# Patient Record
Sex: FEMALE | Race: WHITE | NOT HISPANIC OR LATINO | Employment: FULL TIME | URBAN - METROPOLITAN AREA
[De-identification: names, ages, dates, MRNs, and addresses within clinical notes are randomized per-mention and may not be internally consistent; named-entity substitution may affect disease eponyms.]

---

## 2018-04-09 LAB
HBV SURFACE AG SERPL QL IA: NON REACTIVE
HIV 1+2 AB+HIV1 P24 AG SERPL QL IA: NON REACTIVE
RUBELLA ANTIBODY IGG QUANTITATIVE: NORMAL IU/ML
T PALLIDUM IGG SER QL: NON REACTIVE

## 2018-10-18 LAB — GROUP B STREP PCR: POSITIVE

## 2018-11-04 ENCOUNTER — HOSPITAL ENCOUNTER (OUTPATIENT)
Dept: LAB | Facility: CLINIC | Age: 33
Discharge: HOME | End: 2018-11-04
Attending: OBSTETRICS & GYNECOLOGY | Admitting: OBSTETRICS & GYNECOLOGY
Payer: COMMERCIAL

## 2018-11-04 ENCOUNTER — ANESTHESIA EVENT (OUTPATIENT)
Dept: OBGYN | Facility: CLINIC | Age: 33
End: 2018-11-04
Payer: COMMERCIAL

## 2018-11-04 DIAGNOSIS — Z01.812 PRE-OPERATIVE LABORATORY EXAMINATION: ICD-10-CM

## 2018-11-04 LAB
ABO + RH BLD: NORMAL
ABO + RH BLD: NORMAL
BLD GP AB SCN SERPL QL: NORMAL
BLOOD BANK CMNT PATIENT-IMP: NORMAL
HGB BLD-MCNC: 10.1 G/DL (ref 11.7–15.7)
SPECIMEN EXP DATE BLD: NORMAL

## 2018-11-04 PROCEDURE — 86900 BLOOD TYPING SEROLOGIC ABO: CPT | Performed by: OBSTETRICS & GYNECOLOGY

## 2018-11-04 PROCEDURE — 36415 COLL VENOUS BLD VENIPUNCTURE: CPT | Performed by: OBSTETRICS & GYNECOLOGY

## 2018-11-04 PROCEDURE — 86901 BLOOD TYPING SEROLOGIC RH(D): CPT | Performed by: OBSTETRICS & GYNECOLOGY

## 2018-11-04 PROCEDURE — 85018 HEMOGLOBIN: CPT | Performed by: OBSTETRICS & GYNECOLOGY

## 2018-11-04 PROCEDURE — 86850 RBC ANTIBODY SCREEN: CPT | Performed by: OBSTETRICS & GYNECOLOGY

## 2018-11-04 PROCEDURE — 86780 TREPONEMA PALLIDUM: CPT | Performed by: OBSTETRICS & GYNECOLOGY

## 2018-11-05 ENCOUNTER — ANESTHESIA (OUTPATIENT)
Dept: OBGYN | Facility: CLINIC | Age: 33
End: 2018-11-05
Payer: COMMERCIAL

## 2018-11-05 ENCOUNTER — HOSPITAL ENCOUNTER (INPATIENT)
Facility: CLINIC | Age: 33
LOS: 3 days | Discharge: HOME | End: 2018-11-08
Attending: OBSTETRICS & GYNECOLOGY | Admitting: OBSTETRICS & GYNECOLOGY
Payer: COMMERCIAL

## 2018-11-05 DIAGNOSIS — Z98.891 S/P CESAREAN SECTION: Primary | ICD-10-CM

## 2018-11-05 LAB
GLUCOSE BLDC GLUCOMTR-MCNC: 94 MG/DL (ref 70–99)
T PALLIDUM AB SER QL: NONREACTIVE

## 2018-11-05 PROCEDURE — 25000128 H RX IP 250 OP 636: Performed by: ANESTHESIOLOGY

## 2018-11-05 PROCEDURE — 25000128 H RX IP 250 OP 636: Performed by: OBSTETRICS & GYNECOLOGY

## 2018-11-05 PROCEDURE — 27210794 ZZH OR GENERAL SUPPLY STERILE: Performed by: OBSTETRICS & GYNECOLOGY

## 2018-11-05 PROCEDURE — 36000056 ZZH SURGERY LEVEL 3 1ST 30 MIN: Performed by: OBSTETRICS & GYNECOLOGY

## 2018-11-05 PROCEDURE — 25000128 H RX IP 250 OP 636: Performed by: NURSE ANESTHETIST, CERTIFIED REGISTERED

## 2018-11-05 PROCEDURE — 12000029 ZZH R&B OB INTERMEDIATE

## 2018-11-05 PROCEDURE — 37000008 ZZH ANESTHESIA TECHNICAL FEE, 1ST 30 MIN: Performed by: OBSTETRICS & GYNECOLOGY

## 2018-11-05 PROCEDURE — 25000125 ZZHC RX 250: Performed by: OBSTETRICS & GYNECOLOGY

## 2018-11-05 PROCEDURE — 99252 IP/OBS CONSLTJ NEW/EST SF 35: CPT | Performed by: PHYSICIAN ASSISTANT

## 2018-11-05 PROCEDURE — 71000013 ZZH RECOVERY PHASE 1 LEVEL 1 EA ADDTL HR: Performed by: OBSTETRICS & GYNECOLOGY

## 2018-11-05 PROCEDURE — 36000058 ZZH SURGERY LEVEL 3 EA 15 ADDTL MIN: Performed by: OBSTETRICS & GYNECOLOGY

## 2018-11-05 PROCEDURE — 71000012 ZZH RECOVERY PHASE 1 LEVEL 1 FIRST HR: Performed by: OBSTETRICS & GYNECOLOGY

## 2018-11-05 PROCEDURE — 37000009 ZZH ANESTHESIA TECHNICAL FEE, EACH ADDTL 15 MIN: Performed by: OBSTETRICS & GYNECOLOGY

## 2018-11-05 PROCEDURE — 25000125 ZZHC RX 250: Performed by: NURSE ANESTHETIST, CERTIFIED REGISTERED

## 2018-11-05 PROCEDURE — 00000146 ZZHCL STATISTIC GLUCOSE BY METER IP

## 2018-11-05 PROCEDURE — 25000132 ZZH RX MED GY IP 250 OP 250 PS 637: Performed by: OBSTETRICS & GYNECOLOGY

## 2018-11-05 PROCEDURE — 25000131 ZZH RX MED GY IP 250 OP 636 PS 637: Performed by: PHYSICIAN ASSISTANT

## 2018-11-05 PROCEDURE — 25000132 ZZH RX MED GY IP 250 OP 250 PS 637: Performed by: ANESTHESIOLOGY

## 2018-11-05 RX ORDER — NALBUPHINE HYDROCHLORIDE 10 MG/ML
2.5-5 INJECTION, SOLUTION INTRAMUSCULAR; INTRAVENOUS; SUBCUTANEOUS EVERY 6 HOURS PRN
Status: DISCONTINUED | OUTPATIENT
Start: 2018-11-05 | End: 2018-11-08 | Stop reason: HOSPADM

## 2018-11-05 RX ORDER — FENTANYL CITRATE 50 UG/ML
25-50 INJECTION, SOLUTION INTRAMUSCULAR; INTRAVENOUS EVERY 5 MIN PRN
Status: DISCONTINUED | OUTPATIENT
Start: 2018-11-05 | End: 2018-11-05 | Stop reason: HOSPADM

## 2018-11-05 RX ORDER — SCOLOPAMINE TRANSDERMAL SYSTEM 1 MG/1
1 PATCH, EXTENDED RELEASE TRANSDERMAL ONCE
Status: DISCONTINUED | OUTPATIENT
Start: 2018-11-05 | End: 2018-11-06 | Stop reason: CLARIF

## 2018-11-05 RX ORDER — ALBUTEROL SULFATE 0.83 MG/ML
2.5 SOLUTION RESPIRATORY (INHALATION) EVERY 4 HOURS PRN
Status: DISCONTINUED | OUTPATIENT
Start: 2018-11-05 | End: 2018-11-05 | Stop reason: HOSPADM

## 2018-11-05 RX ORDER — MAGNESIUM HYDROXIDE 1200 MG/15ML
LIQUID ORAL PRN
Status: DISCONTINUED | OUTPATIENT
Start: 2018-11-05 | End: 2018-11-05

## 2018-11-05 RX ORDER — MERCAPTOPURINE 50 MG/1
50 TABLET ORAL DAILY
Status: DISCONTINUED | OUTPATIENT
Start: 2018-11-05 | End: 2018-11-06

## 2018-11-05 RX ORDER — BUPIVACAINE HYDROCHLORIDE 7.5 MG/ML
INJECTION, SOLUTION INTRASPINAL PRN
Status: DISCONTINUED | OUTPATIENT
Start: 2018-11-05 | End: 2018-11-05

## 2018-11-05 RX ORDER — HYDROMORPHONE HYDROCHLORIDE 2 MG/1
2-4 TABLET ORAL
Status: DISCONTINUED | OUTPATIENT
Start: 2018-11-05 | End: 2018-11-08 | Stop reason: HOSPADM

## 2018-11-05 RX ORDER — METOCLOPRAMIDE HYDROCHLORIDE 5 MG/ML
INJECTION INTRAMUSCULAR; INTRAVENOUS PRN
Status: DISCONTINUED | OUTPATIENT
Start: 2018-11-05 | End: 2018-11-05

## 2018-11-05 RX ORDER — NALOXONE HYDROCHLORIDE 0.4 MG/ML
.1-.4 INJECTION, SOLUTION INTRAMUSCULAR; INTRAVENOUS; SUBCUTANEOUS
Status: DISCONTINUED | OUTPATIENT
Start: 2018-11-05 | End: 2018-11-08 | Stop reason: HOSPADM

## 2018-11-05 RX ORDER — OXYTOCIN/0.9 % SODIUM CHLORIDE 30/500 ML
PLASTIC BAG, INJECTION (ML) INTRAVENOUS PRN
Status: DISCONTINUED | OUTPATIENT
Start: 2018-11-05 | End: 2018-11-05

## 2018-11-05 RX ORDER — LIDOCAINE 40 MG/G
CREAM TOPICAL
Status: DISCONTINUED | OUTPATIENT
Start: 2018-11-05 | End: 2018-11-08 | Stop reason: HOSPADM

## 2018-11-05 RX ORDER — DEXTROSE, SODIUM CHLORIDE, SODIUM LACTATE, POTASSIUM CHLORIDE, AND CALCIUM CHLORIDE 5; .6; .31; .03; .02 G/100ML; G/100ML; G/100ML; G/100ML; G/100ML
INJECTION, SOLUTION INTRAVENOUS CONTINUOUS
Status: DISCONTINUED | OUTPATIENT
Start: 2018-11-05 | End: 2018-11-08 | Stop reason: HOSPADM

## 2018-11-05 RX ORDER — AMOXICILLIN 250 MG
2 CAPSULE ORAL 2 TIMES DAILY PRN
Status: DISCONTINUED | OUTPATIENT
Start: 2018-11-05 | End: 2018-11-08 | Stop reason: HOSPADM

## 2018-11-05 RX ORDER — NICOTINE POLACRILEX 4 MG
15-30 LOZENGE BUCCAL
Status: DISCONTINUED | OUTPATIENT
Start: 2018-11-05 | End: 2018-11-05

## 2018-11-05 RX ORDER — DEXTROSE MONOHYDRATE 25 G/50ML
25-50 INJECTION, SOLUTION INTRAVENOUS
Status: DISCONTINUED | OUTPATIENT
Start: 2018-11-05 | End: 2018-11-05

## 2018-11-05 RX ORDER — HYDRALAZINE HYDROCHLORIDE 20 MG/ML
2.5-5 INJECTION INTRAMUSCULAR; INTRAVENOUS EVERY 10 MIN PRN
Status: DISCONTINUED | OUTPATIENT
Start: 2018-11-05 | End: 2018-11-05 | Stop reason: HOSPADM

## 2018-11-05 RX ORDER — OXYTOCIN/0.9 % SODIUM CHLORIDE 30/500 ML
340 PLASTIC BAG, INJECTION (ML) INTRAVENOUS CONTINUOUS PRN
Status: DISCONTINUED | OUTPATIENT
Start: 2018-11-05 | End: 2018-11-08 | Stop reason: HOSPADM

## 2018-11-05 RX ORDER — ONDANSETRON 2 MG/ML
4 INJECTION INTRAMUSCULAR; INTRAVENOUS EVERY 30 MIN PRN
Status: DISCONTINUED | OUTPATIENT
Start: 2018-11-05 | End: 2018-11-08 | Stop reason: HOSPADM

## 2018-11-05 RX ORDER — OXYTOCIN 10 [USP'U]/ML
10 INJECTION, SOLUTION INTRAMUSCULAR; INTRAVENOUS
Status: DISCONTINUED | OUTPATIENT
Start: 2018-11-05 | End: 2018-11-08 | Stop reason: HOSPADM

## 2018-11-05 RX ORDER — ONDANSETRON 2 MG/ML
4 INJECTION INTRAMUSCULAR; INTRAVENOUS EVERY 4 HOURS PRN
Status: DISCONTINUED | OUTPATIENT
Start: 2018-11-05 | End: 2018-11-08 | Stop reason: HOSPADM

## 2018-11-05 RX ORDER — OXYCODONE HYDROCHLORIDE 5 MG/1
5 TABLET ORAL EVERY 4 HOURS PRN
Status: DISCONTINUED | OUTPATIENT
Start: 2018-11-05 | End: 2018-11-08 | Stop reason: HOSPADM

## 2018-11-05 RX ORDER — GLYCOPYRROLATE 0.2 MG/ML
INJECTION, SOLUTION INTRAMUSCULAR; INTRAVENOUS PRN
Status: DISCONTINUED | OUTPATIENT
Start: 2018-11-05 | End: 2018-11-05

## 2018-11-05 RX ORDER — CEFAZOLIN SODIUM 2 G/100ML
2 INJECTION, SOLUTION INTRAVENOUS
Status: COMPLETED | OUTPATIENT
Start: 2018-11-05 | End: 2018-11-05

## 2018-11-05 RX ORDER — ONDANSETRON 2 MG/ML
INJECTION INTRAMUSCULAR; INTRAVENOUS PRN
Status: DISCONTINUED | OUTPATIENT
Start: 2018-11-05 | End: 2018-11-05

## 2018-11-05 RX ORDER — AMOXICILLIN 250 MG
1 CAPSULE ORAL 2 TIMES DAILY PRN
Status: DISCONTINUED | OUTPATIENT
Start: 2018-11-05 | End: 2018-11-08 | Stop reason: HOSPADM

## 2018-11-05 RX ORDER — LEVOTHYROXINE SODIUM 88 UG/1
88 TABLET ORAL DAILY
Status: DISCONTINUED | OUTPATIENT
Start: 2018-11-05 | End: 2018-11-08 | Stop reason: HOSPADM

## 2018-11-05 RX ORDER — MORPHINE SULFATE 1 MG/ML
INJECTION, SOLUTION EPIDURAL; INTRATHECAL; INTRAVENOUS PRN
Status: DISCONTINUED | OUTPATIENT
Start: 2018-11-05 | End: 2018-11-05

## 2018-11-05 RX ORDER — SODIUM CHLORIDE, SODIUM LACTATE, POTASSIUM CHLORIDE, CALCIUM CHLORIDE 600; 310; 30; 20 MG/100ML; MG/100ML; MG/100ML; MG/100ML
INJECTION, SOLUTION INTRAVENOUS CONTINUOUS
Status: DISCONTINUED | OUTPATIENT
Start: 2018-11-05 | End: 2018-11-05

## 2018-11-05 RX ORDER — CITRIC ACID/SODIUM CITRATE 334-500MG
30 SOLUTION, ORAL ORAL
Status: COMPLETED | OUTPATIENT
Start: 2018-11-05 | End: 2018-11-05

## 2018-11-05 RX ORDER — ACETAMINOPHEN 325 MG/1
975 TABLET ORAL EVERY 8 HOURS
Status: COMPLETED | OUTPATIENT
Start: 2018-11-05 | End: 2018-11-08

## 2018-11-05 RX ORDER — METOPROLOL TARTRATE 1 MG/ML
1-2 INJECTION, SOLUTION INTRAVENOUS EVERY 5 MIN PRN
Status: DISCONTINUED | OUTPATIENT
Start: 2018-11-05 | End: 2018-11-05 | Stop reason: HOSPADM

## 2018-11-05 RX ORDER — NALOXONE HYDROCHLORIDE 0.4 MG/ML
.1-.4 INJECTION, SOLUTION INTRAMUSCULAR; INTRAVENOUS; SUBCUTANEOUS
Status: ACTIVE | OUTPATIENT
Start: 2018-11-05 | End: 2018-11-06

## 2018-11-05 RX ORDER — NICOTINE POLACRILEX 4 MG
15-30 LOZENGE BUCCAL
Status: DISCONTINUED | OUTPATIENT
Start: 2018-11-05 | End: 2018-11-08 | Stop reason: HOSPADM

## 2018-11-05 RX ORDER — HYDROCORTISONE 2.5 %
CREAM (GRAM) TOPICAL 3 TIMES DAILY PRN
Status: DISCONTINUED | OUTPATIENT
Start: 2018-11-05 | End: 2018-11-08 | Stop reason: HOSPADM

## 2018-11-05 RX ORDER — LANOLIN 100 %
OINTMENT (GRAM) TOPICAL
Status: DISCONTINUED | OUTPATIENT
Start: 2018-11-05 | End: 2018-11-08 | Stop reason: HOSPADM

## 2018-11-05 RX ORDER — FENTANYL CITRATE 50 UG/ML
25-50 INJECTION, SOLUTION INTRAMUSCULAR; INTRAVENOUS
Status: DISCONTINUED | OUTPATIENT
Start: 2018-11-05 | End: 2018-11-06

## 2018-11-05 RX ORDER — OXYTOCIN/0.9 % SODIUM CHLORIDE 30/500 ML
100 PLASTIC BAG, INJECTION (ML) INTRAVENOUS CONTINUOUS
Status: DISCONTINUED | OUTPATIENT
Start: 2018-11-05 | End: 2018-11-08 | Stop reason: HOSPADM

## 2018-11-05 RX ORDER — SIMETHICONE 80 MG
80 TABLET,CHEWABLE ORAL 4 TIMES DAILY PRN
Status: DISCONTINUED | OUTPATIENT
Start: 2018-11-05 | End: 2018-11-08 | Stop reason: HOSPADM

## 2018-11-05 RX ORDER — SODIUM CHLORIDE, SODIUM LACTATE, POTASSIUM CHLORIDE, CALCIUM CHLORIDE 600; 310; 30; 20 MG/100ML; MG/100ML; MG/100ML; MG/100ML
INJECTION, SOLUTION INTRAVENOUS CONTINUOUS
Status: DISCONTINUED | OUTPATIENT
Start: 2018-11-05 | End: 2018-11-08 | Stop reason: HOSPADM

## 2018-11-05 RX ORDER — HYDROMORPHONE HYDROCHLORIDE 1 MG/ML
.3-.5 INJECTION, SOLUTION INTRAMUSCULAR; INTRAVENOUS; SUBCUTANEOUS EVERY 10 MIN PRN
Status: DISCONTINUED | OUTPATIENT
Start: 2018-11-05 | End: 2018-11-06

## 2018-11-05 RX ORDER — EPHEDRINE SULFATE 50 MG/ML
INJECTION, SOLUTION INTRAVENOUS PRN
Status: DISCONTINUED | OUTPATIENT
Start: 2018-11-05 | End: 2018-11-05

## 2018-11-05 RX ORDER — MEPERIDINE HYDROCHLORIDE 50 MG/ML
12.5 INJECTION INTRAMUSCULAR; INTRAVENOUS; SUBCUTANEOUS
Status: DISCONTINUED | OUTPATIENT
Start: 2018-11-05 | End: 2018-11-08 | Stop reason: HOSPADM

## 2018-11-05 RX ORDER — ACETAMINOPHEN 325 MG/1
650 TABLET ORAL EVERY 4 HOURS PRN
Status: DISCONTINUED | OUTPATIENT
Start: 2018-11-08 | End: 2018-11-08 | Stop reason: HOSPADM

## 2018-11-05 RX ORDER — ONDANSETRON 2 MG/ML
4 INJECTION INTRAMUSCULAR; INTRAVENOUS EVERY 6 HOURS PRN
Status: DISCONTINUED | OUTPATIENT
Start: 2018-11-05 | End: 2018-11-08 | Stop reason: HOSPADM

## 2018-11-05 RX ORDER — ONDANSETRON 4 MG/1
4 TABLET, ORALLY DISINTEGRATING ORAL EVERY 30 MIN PRN
Status: DISCONTINUED | OUTPATIENT
Start: 2018-11-05 | End: 2018-11-08 | Stop reason: HOSPADM

## 2018-11-05 RX ORDER — FENTANYL CITRATE 50 UG/ML
INJECTION, SOLUTION INTRAMUSCULAR; INTRAVENOUS PRN
Status: DISCONTINUED | OUTPATIENT
Start: 2018-11-05 | End: 2018-11-05

## 2018-11-05 RX ORDER — NALOXONE HYDROCHLORIDE 0.4 MG/ML
.1-.4 INJECTION, SOLUTION INTRAMUSCULAR; INTRAVENOUS; SUBCUTANEOUS
Status: DISCONTINUED | OUTPATIENT
Start: 2018-11-05 | End: 2018-11-06

## 2018-11-05 RX ORDER — DEXTROSE MONOHYDRATE 25 G/50ML
25-50 INJECTION, SOLUTION INTRAVENOUS
Status: DISCONTINUED | OUTPATIENT
Start: 2018-11-05 | End: 2018-11-08 | Stop reason: HOSPADM

## 2018-11-05 RX ORDER — HYDROMORPHONE HYDROCHLORIDE 1 MG/ML
.3-.5 INJECTION, SOLUTION INTRAMUSCULAR; INTRAVENOUS; SUBCUTANEOUS EVERY 30 MIN PRN
Status: DISCONTINUED | OUTPATIENT
Start: 2018-11-05 | End: 2018-11-08 | Stop reason: HOSPADM

## 2018-11-05 RX ORDER — BISACODYL 10 MG
10 SUPPOSITORY, RECTAL RECTAL DAILY PRN
Status: DISCONTINUED | OUTPATIENT
Start: 2018-11-07 | End: 2018-11-08 | Stop reason: HOSPADM

## 2018-11-05 RX ADMIN — BUPIVACAINE HYDROCHLORIDE IN DEXTROSE 13.5 MG: 7.5 INJECTION, SOLUTION SUBARACHNOID at 07:34

## 2018-11-05 RX ADMIN — NALBUPHINE HYDROCHLORIDE 5 MG: 10 INJECTION, SOLUTION INTRAMUSCULAR; INTRAVENOUS; SUBCUTANEOUS at 09:48

## 2018-11-05 RX ADMIN — ONDANSETRON 4 MG: 2 INJECTION INTRAMUSCULAR; INTRAVENOUS at 07:44

## 2018-11-05 RX ADMIN — SENNOSIDES AND DOCUSATE SODIUM 1 TABLET: 8.6; 5 TABLET ORAL at 21:50

## 2018-11-05 RX ADMIN — OXYTOCIN-SODIUM CHLORIDE 0.9% IV SOLN 30 UNIT/500ML 100 ML/HR: 30-0.9/5 SOLUTION at 11:45

## 2018-11-05 RX ADMIN — Medication 0.15 MG: at 07:34

## 2018-11-05 RX ADMIN — CEFAZOLIN SODIUM 2 G: 2 INJECTION, SOLUTION INTRAVENOUS at 07:31

## 2018-11-05 RX ADMIN — EPHEDRINE SULFATE 10 MG: 50 INJECTION, SOLUTION INTRAVENOUS at 07:47

## 2018-11-05 RX ADMIN — ACETAMINOPHEN 975 MG: 325 TABLET, FILM COATED ORAL at 13:39

## 2018-11-05 RX ADMIN — INSULIN ASPART 5 UNITS: 100 INJECTION, SOLUTION INTRAVENOUS; SUBCUTANEOUS at 18:40

## 2018-11-05 RX ADMIN — PHENYLEPHRINE HYDROCHLORIDE 100 MCG: 10 INJECTION, SOLUTION INTRAMUSCULAR; INTRAVENOUS; SUBCUTANEOUS at 08:10

## 2018-11-05 RX ADMIN — PHENYLEPHRINE HYDROCHLORIDE 100 MCG: 10 INJECTION, SOLUTION INTRAMUSCULAR; INTRAVENOUS; SUBCUTANEOUS at 07:41

## 2018-11-05 RX ADMIN — OXYTOCIN-SODIUM CHLORIDE 0.9% IV SOLN 30 UNIT/500ML 1 ML: 30-0.9/5 SOLUTION at 08:02

## 2018-11-05 RX ADMIN — OXYCODONE HYDROCHLORIDE 5 MG: 5 TABLET ORAL at 15:47

## 2018-11-05 RX ADMIN — SODIUM CHLORIDE, SODIUM LACTATE, POTASSIUM CHLORIDE, CALCIUM CHLORIDE AND DEXTROSE MONOHYDRATE: 5; 600; 310; 30; 20 INJECTION, SOLUTION INTRAVENOUS at 16:52

## 2018-11-05 RX ADMIN — OXYTOCIN-SODIUM CHLORIDE 0.9% IV SOLN 30 UNIT/500ML 199 ML: 30-0.9/5 SOLUTION at 08:17

## 2018-11-05 RX ADMIN — GLYCOPYRROLATE 0.2 MG: 0.2 INJECTION, SOLUTION INTRAMUSCULAR; INTRAVENOUS at 07:51

## 2018-11-05 RX ADMIN — ACETAMINOPHEN 975 MG: 325 TABLET, FILM COATED ORAL at 21:50

## 2018-11-05 RX ADMIN — FENTANYL CITRATE 20 MCG: 50 INJECTION, SOLUTION INTRAMUSCULAR; INTRAVENOUS at 07:34

## 2018-11-05 RX ADMIN — SODIUM CHLORIDE, POTASSIUM CHLORIDE, SODIUM LACTATE AND CALCIUM CHLORIDE: 600; 310; 30; 20 INJECTION, SOLUTION INTRAVENOUS at 07:22

## 2018-11-05 RX ADMIN — METOCLOPRAMIDE HYDROCHLORIDE 10 MG: 5 INJECTION INTRAMUSCULAR; INTRAVENOUS at 07:44

## 2018-11-05 RX ADMIN — SODIUM CITRATE AND CITRIC ACID MONOHYDRATE 30 ML: 500; 334 SOLUTION ORAL at 06:30

## 2018-11-05 RX ADMIN — EPHEDRINE SULFATE 10 MG: 50 INJECTION, SOLUTION INTRAVENOUS at 07:39

## 2018-11-05 RX ADMIN — SODIUM CHLORIDE, POTASSIUM CHLORIDE, SODIUM LACTATE AND CALCIUM CHLORIDE 1000 ML: 600; 310; 30; 20 INJECTION, SOLUTION INTRAVENOUS at 06:13

## 2018-11-05 NOTE — ANESTHESIA POSTPROCEDURE EVALUATION
Patient: Courtney Sparks    Procedure(s):   SECTION    Diagnosis:Previous  Diagnosis Additional Information: Pre-operative diagnosis: Previous  section  Post-operative diagnosis Same  Procedure: Procedure(s):   SECTION        Anesthesia Type:  Spinal    Note:  Anesthesia Post Evaluation    Patient location during evaluation: OB PACU  Patient participation: Able to fully participate in evaluation  Level of consciousness: awake  Pain management: adequate  Airway patency: patent  Cardiovascular status: acceptable  Respiratory status: acceptable  Hydration status: acceptable  PONV: controlled     Anesthetic complications: None    Comments: Anticipate full return of neurologic function          Last vitals:  Vitals:    18 0602 18 0836   BP: 132/66 97/54   Pulse: 99    Resp: 16 (P) 18   Temp: 99  F (37.2  C) (P) 97.7  F (36.5  C)   SpO2: 99% (P) 99%         Electronically Signed By: Ronni Muñiz MD  2018  9:08 AM

## 2018-11-05 NOTE — ANESTHESIA PROCEDURE NOTES
Peripheral nerve/Neuraxial procedure note : intrathecal  Pre-Procedure  Performed by MARY COLUNGA  Referred by AMISHA  Location: OR    Procedure Times:11/5/2018 7:31 AM and 11/5/2018 7:34 AM  Pre-Anesthestic Checklist: patient identified, IV checked, risks and benefits discussed, informed consent, monitors and equipment checked, pre-op evaluation and at physician/surgeon's request    Timeout  Correct Patient: Yes   Correct Procedure: Yes   Correct Site: Yes   Correct Laterality: N/A   Correct Position: Yes   Site Marked: N/A   .   Procedure Documentation  ASA 3  Diagnosis:repeat csection.    Procedure:    Intrathecal.  Insertion Site:L3-4  (midline approach)      Patient Prep;mask, sterile gloves, povidone-iodine 7.5% surgical scrub.  .  Needle: Kate tip Spinal Needle (gauge): 25  Spinal/LP Needle Length (inches): 3.5 # of attempts: 1 and # of redirects:  Introducer used .       Assessment/Narrative  Paresthesias: Yes and Resolved.  .  .  clear CSF fluid removed .

## 2018-11-05 NOTE — ANESTHESIA PREPROCEDURE EVALUATION
PAC NOTE:       ANESTHESIA PRE EVALUATION:  Anesthesia Evaluation     . Pt has had prior anesthetic. Type: Regional    No history of anesthetic complications          ROS/MED HX    ENT/Pulmonary:  - neg pulmonary ROS     Neurologic:       Cardiovascular:     (+) Dyslipidemia, ----. : . . . :. .       METS/Exercise Tolerance:     Hematologic:     (+) Other Hematologic Disorder-beta thalassemia      Musculoskeletal:         GI/Hepatic:     (+) Inflammatory bowel disease (ulcerative colitis),       Renal/Genitourinary:         Endo:     (+) type I DM, Using insulin thyroid problem hypothyroidism, .      Psychiatric:         Infectious Disease:         Malignancy:         Other:                     Physical Exam      Airway   Mallampati: II  TM distance: >3 FB  Neck ROM: full    Dental     Cardiovascular   Rhythm and rate: regular and normal      Pulmonary    breath sounds clear to auscultation    Other findings: Lab Test        11/04/18     06/25/15     06/24/15     06/24/15     06/23/15      --          06/22/15                       0915          0654          1107          0650          0600           --           0140          WBC           --          12.7*         --          16.5*        21.8*          < >        19.8*         HGB          10.1*        7.8*          --          7.8*         8.1*           < >        10.3*         MCV           --          72*           --          73*          72*            < >        72*           PLT           --          448           --          339          307            < >        347           INR           --          1.18*        Unable to c*  --           --           --          1.27*          < > = values in this interval not displayed.                  Lab Test        06/25/15     06/24/15     06/23/15                       0654          0650          0600          NA           137          139          139           POTASSIUM    3.8          4.1          3.9            CHLORIDE     105          108          109           CO2          23           20           22            BUN          10           11           8             CR           0.64         0.71         0.76          ANIONGAP     9            11           8             OANH          7.3*         7.1*         6.8*          GLC          187*         127*         59*                  Anesthesia Plan      History & Physical Review  History and physical reviewed and following examination; no interval change.    ASA Status:  3 .    NPO Status:  > 8 hours    Plan for Spinal with Intravenous and Propofol induction. Maintenance will be Balanced.    PONV prophylaxis:  Ondansetron (or other 5HT-3) and Dexamethasone or Solumedrol       Postoperative Care  Postoperative pain management:  IV analgesics, Oral pain medications and Multi-modal analgesia.      Consents  Anesthetic plan, risks, benefits and alternatives discussed with:  Patient..                            .

## 2018-11-05 NOTE — IP AVS SNAPSHOT
MRN:[id number]                      After Visit Summary   11/5/2018    Courtney Sparks    MRN: [id number]           Thank you!     Thank you for choosing [facility] for your care. Our goal is always to provide you with excellent care. Hearing back from our patients is one way we can continue to improve our services. Please take a few minutes to complete the written survey that you may receive in the mail after you visit. If you would like to speak to someone directly about your visit please contact Patient Relations at [phone]. Thank you!          Patient Information     Date Of Birth          1985        Designated Caregiver       Most Recent Value    Caregiver    Will someone help with your care after discharge? no      About your hospital stay     You were admitted on:  November 5, 2018 You last received care in the:  [facility]    You were discharged on:  November 8, 2018        Reason for your hospital stay       Delivery of your baby!                  Who to Call     For medical emergencies, please call 911.  For non-urgent questions about your medical care, please call your primary care provider or clinic, [phone]  For questions related to your surgery, please call your surgery clinic        Attending Provider     Provider Specialty    Brayden Menjivar MD OB/Gyn       Primary Care Provider Office Phone # Fax #    Eden Harmon [phone]       When to contact your care team       Please call the clinic if: -You are soaking a thick pad with blood in 1 hour for 2 hours in a row. -Your bleeding becomes foul smelling. -You develop fever greater than 100.4. -Your breast(s) develop redness, swelling and pain. -Postpartum depression is a real and treatable condition. It is normal to feel some sadness during the first two weeks postpartum. If you feel that you are not caring for yourself, do not want to get out of bed, take care of  your baby or feel depressed or hopeless please call the clinic. If you feel like hurting yourself or your baby call a crisis center or come to the emergency department.                  Follow-up Appointments     Follow-up and recommended labs and tests        - Follow up with your primary OB provider in  weeks for your post-partum visit and to check your thyroid checked  - Follow up with your endocrinologist as previously discussed  - Follow up with your GI specialist as needed  - Follow-up with endocrinology in 5-6 weeks to discuss diabetic management and insulin dosing.  I would recommend continuing 8 units twice daily of Tresiba and your short acting meal coverage and correction scale as previously being performed.  If blood glucose continuously remains elevated I would increase your Tresiba to 10 units subcutaneous twice daily.  Conversely if blood sugar is consistently low I would decrease Tresiba to 6 units twice daily subcutaneous.  Also notify your PCP or endocrinologist of any of these changes.                  Further instructions from your care team       Postop  Birth Instructions    Activity       Do not lift more than 10 pounds for 6 weeks after surgery.  Ask family and friends for help when you need it.    No driving until you have stopped taking your pain medications (usually two weeks after surgery).    No heavy exercise or activity for 6 weeks.  Don't do anything that will put a strain on your surgery site.    Don't strain when using the toilet.  Your care team may prescribe a stool softener if you have problems with your bowel movements.     To care for your incision:       Keep the incision clean and dry.    Do not soak your incision in water. No swimming or hot tubs until it has fully healed. You may soak in the bathtub if the water level is below your incision.    Do not use peroxide, gel, cream, lotion, or ointment on your incision.    Adjust your clothes to avoid pressure on your  surgery site (check the elastic in your underwear for example).     You may see a small amount of clear or pink drainage and this is normal.  Check with your health care provider:       If the drainage increases or has an odor.    If the incision reddens, you have swelling, or develop a rash.    If you have increased pain and the medicine we prescribed doesn't help.    If you have a fever above 100.4 F (38 C) with or without chills when placing thermometer under your tongue.   The area around your incision (surgery wound), will feel numb.  This is normal. The numbness should go away in less than a year.     Keep your hands clean:  Always wash your hands before touching your incision (surgery wound). This helps reduce your risk of infection. If your hands aren't dirty, you may use an alcohol hand-rub to clean your hands. Keep your nails clean and short.    Call your healthcare provider if you have any of these symptoms:       You soak a sanitary pad with blood within 1 hour, or you see blood clots larger than a golf ball.    Bleeding that lasts more than 6 weeks.    Vaginal discharge that smells bad.    Severe pain, cramping or tenderness in your lower belly area.    A need to urinate more frequently (use the toilet more often), more urgently (use the toilet very quickly), or it burns when you urinate.    Nausea and vomiting.    Redness, swelling or pain around a vein in your leg.    Problems breastfeeding or a red or painful area on your breast.    Chest pain and cough or are gasping for air.    Problems with coping with sadness, anxiety or depression. If you have concerns about hurting yourself or the baby, call your provider immediately.      You have questions or concerns after you return home.                  Pending Results     No orders found from 11/3/2018 to 11/6/2018.            Admission Information     Date & Time Provider Department Dept. Phone    11/5/2018 Brayden Menjivar MD [facility]  "Postpartum [phone]      Your Vitals Were     Blood Pressure Pulse Temperature Respirations Height Weight    124/83 93 98.1  F (36.7  C) (Oral) 12 1.6 m (5' 3\") 85.7 kg (189 lb)    Last Period Pulse Oximetry BMI (Body Mass Index)             02/06/2018 95% 33.48 kg/m2         [employer] Information     [employer] gives you secure access to your electronic health record. If you see a primary care provider, you can also send messages to your care team and make appointments. If you have questions, please call your primary care clinic.  If you do not have a primary care provider, please call [phone] and they will assist you.        Care EveryWhere ID     This is your Care EveryWhere ID. This could be used by other organizations to access your [facility] medical records  [id number]        Equal Access to Services     KONG ROYAL : Karine Garces, aubrey dunbar, bowen hanna. So [facility] [phone].    ATENCIÓN: Si habla español, tiene a streeter disposición servicios gratuitos de asistencia lingüística. Mary Anne al [phone].    We comply with applicable federal civil rights laws and [place] laws. We do not discriminate on the basis of race, color, national origin, age, disability, sex, sexual orientation, or gender identity.               Review of your medicines      START taking        Dose / Directions    acetaminophen 325 MG tablet   Commonly known as:  TYLENOL        Dose:  650 mg   Take 2 tablets (650 mg) by mouth every 4 hours as needed for other (multimodal surgical pain management along with NSAIDS and opioid medication as indicated based on pain control and physical function.)   Quantity:  50 tablet   Refills:  1       ferrous sulfate 325 (65 Fe) MG tablet   Commonly known as:  IRON        Dose:  325 mg   Take 1 tablet (325 mg) by mouth 2 times daily   Quantity:  60 tablet   Refills:  1       oxyCODONE IR 5 MG tablet   Commonly known as:  " ROXICODONE        Dose:  5 mg   Take 1 tablet (5 mg) by mouth every 4 hours as needed for moderate to severe pain   Quantity:  16 tablet   Refills:  0       senna-docusate 8.6-50 MG per tablet   Commonly known as:  SENOKOT-S;PERICOLACE        Dose:  1 tablet   Take 1 tablet by mouth 2 times daily as needed for constipation   Quantity:  30 tablet   Refills:  1         CONTINUE these medicines which may have CHANGED, or have new prescriptions. If we are uncertain of the size of tablets/capsules you have at home, strength may be listed as something that might have changed.        Dose / Directions    TRESIBA FLEXTOUCH 100 UNIT/ML pen   This may have changed:    - medication strength  - how much to take  - how to take this  - when to take this  - additional instructions   Generic drug:  insulin degludec        8 Units subcutaneous twice daily.   Refills:  0         CONTINUE these medicines which have NOT CHANGED        Dose / Directions    ENTYVIO IV        Every 8 weeks   Refills:  0       NovoLOG FLEXPEN 100 UNIT/ML injection   Generic drug:  insulin aspart        Takes 1 unit for every 25 points of blood glucose over 150; and takes 1 unit/per 3 grams of carbohydrates.   Refills:  0       PURINETHOL PO        Dose:  100 mg   Take 100 mg by mouth daily   Refills:  0       SYNTHROID PO        Dose:  88 mcg   Take 88 mcg by mouth daily   Refills:  0            Where to get your medicines      These medications were sent to [facility] [place] [id number]     Phone:  [phone]     ferrous sulfate 325 (65 Fe) MG tablet    senna-docusate 8.6-50 MG per tablet         Some of these will need a paper prescription and others can be bought over the counter. Ask your nurse if you have questions.     Bring a paper prescription for each of these medications     acetaminophen 325 MG tablet    oxyCODONE IR 5 MG tablet                Protect others  around you: Learn how to safely use, store and throw away your medicines at www.disposemymeds.org.        Information about OPIOIDS     PRESCRIPTION OPIOIDS: WHAT YOU NEED TO KNOW   We gave you an opioid (narcotic) pain medicine. It is important to manage your pain, but opioids are not always the best choice. You should first try all the other options your care team gave you. Take this medicine for as short a time (and as few doses) as possible.    Some activities can increase your pain, such as bandage changes or therapy sessions. It may help to take your pain medicine 30 to 60 minutes before these activities. Reduce your stress by getting enough sleep, working on hobbies you enjoy and practicing relaxation or meditation. Talk to your care team about ways to manage your pain beyond prescription opioids.    These medicines have risks:    DO NOT drive when on new or higher doses of pain medicine. These medicines can affect your alertness and reaction times, and you could be arrested for driving under the influence (DUI). If you need to use opioids long-term, talk to your care team about driving.    DO NOT operate heavy machinery    DO NOT do any other dangerous activities while taking these medicines.    DO NOT drink any alcohol while taking these medicines.     If the opioid prescribed includes acetaminophen, DO NOT take with any other medicines that contain acetaminophen. Read all labels carefully. Look for the word  acetaminophen  or  Tylenol.  Ask your pharmacist if you have questions or are unsure.    You can get addicted to pain medicines, especially if you have a history of addiction (chemical, alcohol or substance dependence). Talk to your care team about ways to reduce this risk.    All opioids tend to cause constipation. Drink plenty of water and eat foods that have a lot of fiber, such as fruits, vegetables, prune juice, apple juice and high-fiber cereal. Take a laxative (Miralax, milk of magnesia, Colace,  Senna) if you don t move your bowels at least every other day. Other side effects include upset stomach, sleepiness, dizziness, throwing up, tolerance (needing more of the medicine to have the same effect), physical dependence and slowed breathing.    Store your pills in a secure place, locked if possible. We will not replace any lost or stolen medicine. If you don t finish your medicine, please throw away (dispose) as directed by your pharmacist. The [place] Pollution Control Agency has more information about safe disposal: https://www.pca.[facility].[place].us/living-green/managing-unwanted-medications             Medication List: This is a list of all your medications and when to take them. Check marks below indicate your daily home schedule. Keep this list as a reference.      Medications           Morning Afternoon Evening Bedtime As Needed    acetaminophen 325 MG tablet   Commonly known as:  TYLENOL   Take 2 tablets (650 mg) by mouth every 4 hours as needed for other (multimodal surgical pain management along with NSAIDS and opioid medication as indicated based on pain control and physical function.)   Last time this was given:  975 mg on 11/8/2018  7:21 AM                                ENTYVIO IV   Every 8 weeks                                ferrous sulfate 325 (65 Fe) MG tablet   Commonly known as:  IRON   Take 1 tablet (325 mg) by mouth 2 times daily                                NovoLOG FLEXPEN 100 UNIT/ML injection   Takes 1 unit for every 25 points of blood glucose over 150; and takes 1 unit/per 3 grams of carbohydrates.   Last time this was given:  5 Units on 11/8/2018  8:45 AM   Generic drug:  insulin aspart                                oxyCODONE IR 5 MG tablet   Commonly known as:  ROXICODONE   Take 1 tablet (5 mg) by mouth every 4 hours as needed for moderate to severe pain   Last time this was given:  5 mg on 11/8/2018 10:48 AM                                PURINETHOL PO   Take 100 mg by mouth daily    Last time this was given:  50 mg on 11/6/2018  8:44 AM                                senna-docusate 8.6-50 MG per tablet   Commonly known as:  SENOKOT-S;PERICOLACE   Take 1 tablet by mouth 2 times daily as needed for constipation   Last time this was given:  2 tablets on 11/8/2018  7:25 AM                                SYNTHROID PO   Take 88 mcg by mouth daily   Last time this was given:  88 mcg on 11/8/2018  7:20 AM                                TRESIBA FLEXTOUCH 100 UNIT/ML pen   8 Units subcutaneous twice daily.   Last time this was given:  6 Units on 11/8/2018  7:23 AM   Generic drug:  insulin degludec

## 2018-11-05 NOTE — LACTATION NOTE
LC to see patient and assist with latch.  Baby has low blood sugars and required formula and gel.  She is able to latch with the nipple shield and has an excellent nursing session with active sucking and the shield was filled with colostrum.  Frequent swallows noted.  Plan for q2 hour feeds with hand expression and pumping if needed or supplements continue.  LC will follow up tomorrow.

## 2018-11-05 NOTE — PLAN OF CARE
Problem: Patient Care Overview  Goal: Plan of Care/Patient Progress Review  Outcome: No Change  VSS, rated incisional pain at 3 and tylenol was administered orally, reviewed pain plan with pt and family, answered questions; breast feeding, shield is introduced for small, flat nipples, was going better after that with good amount of colostrum seen in a shield; type 1 diabetic, has her own glucometer and BS was 57 at noon, had apple juice right after, tolerated well; oriented to room and stay, bonding with infant well, family is visiting now.

## 2018-11-05 NOTE — PLAN OF CARE
Data: Courtney Sparks transferred to 442 via cart at 1115. Baby transferred via parent's arms.  Action: Receiving unit notified of transfer: Yes. Patient and family notified of room change. Report given to Kelsey DOBBINS at 1115. Belongings sent to receiving unit. Accompanied by Registered Nurse. Oriented patient to surroundings. Call light within reach. ID bands double-checked with receiving RN.  Response: Patient tolerated transfer and is stable.

## 2018-11-05 NOTE — ANESTHESIA CARE TRANSFER NOTE
Patient: Courtney Sparks    Procedure(s):   SECTION    Diagnosis: Previous  Diagnosis Additional Information: Pre-operative diagnosis: Previous  section  Post-operative diagnosis Same  Procedure: Procedure(s):   SECTION        Anesthesia Type:   Spinal     Note:  Airway :Room Air  Patient transferred to:Labor and Delivery  Handoff Report: Identifed the Patient, Identified the Reponsible Provider, Reviewed the pertinent medical history, Discussed the surgical course, Reviewed Intra-OP anesthesia mangement and issues during anesthesia, Set expectations for post-procedure period and Allowed opportunity for questions and acknowledgement of understanding      Vitals: (Last set prior to Anesthesia Care Transfer)    CRNA VITALS  2018 0802 - 2018 0839      2018             Pulse: 85    SpO2: 99 %    EKG: Sinus rhythm                Electronically Signed By: Dean Dennis Severson, APRN CRNA  2018  8:39 AM

## 2018-11-05 NOTE — CONSULTS
Hospitalist Consultation      Courtney Sparks MRN# [id number]   YOB: 1985 Age: 33 year old   Date of Admission: 2018     Requesting Physician:  Dr. Menjivar  Reason for consult: Diabetes medical management           Assessment and Plan:   This patient is a 33 year old female with a PMH significant for DM I, beta thalassemia, ulcerative colitis, hypothyroidism migraine headaches who is POD 0 s/p scheduled repeat  section.    1. S/p : Doing well, no current complaints. DVT Prophylaxis: on PCDs as per primary. D/C planning: To home on Thursday.    2. DM I: Longstanding type 1 diabetic; A1c's have been in 5-range throughout pregnancy. She is currently wearing a glucose monitoring meter on her arm and reports her blood glucose levels have been steadily in the 90s since . Patient's endocrinologist Eddie Riley ([facility]). Patient reports being very comfortable managing her blood sugars rather independently.  -Per endocrinologist's recommendations s/p C/S, start Tresiba 8 units BID and resume pre-pregnancy mealtime sliding scale insulin (self-correction only). Endo also recommended glucose checks Q2H.     3. Beta thalassemia: Diagnosed because her father and brother have this diagnosis as well. She personally has had no bleeding or clotting issues previously. Hgb yesterday pre-op was 10.1. No known active bleeding, lightheadedness, sob, acute leg swelling or extremity pain.  -Ok to resume PTA aspirin when deemed safe by primary care team  -Continue PCDs    4. Ulcerative colitis: Continue PTA mercaptopurine 50 mg BID. She is also on vedolizumab infusions Q8 weeks that will be resumed on discharge.    5. Hypothyroidism: Continue PTA levothyroxine 88 mcg daily.             History of Present Illness:   This patient is a 33 year old female who is POD 0 s/p scheduled repeat .  Intra-op report reviewed and showed no intra-op complications.   I/o's reviewed, Currently  "net +500 with good UOP since OR. Hgb pre-op was 10.1.  Currently in MAC area for recovery and doing pretty well, no complaints, VSS.   Currently, today has tolerated water so far, pain controlled, no CP, SOB, no n/v.   O/w other medical problems have been stable, with no recent c/o illness.   She is a longstanding type 1 diabetic and is managed on BID Tresiba and sliding scale insulin. She follows through [facility] with endocrinologist Dr. Eddie Riley. Reviewed his most recent office visit with the patient and recommendations regarding blood glucose management for post-delivery.  She reports that very early this morning around 2 AM she took 8 units of novolog for a high blood glucose reading. She reports that during the pregnancy she had to change up her insulin regimen quite a bit \"to whatever would work,\" but now is expecting to get back to her usual pre-pregnancy regimen. She recalls what her endocrinologist recommended for her regarding reducing her Tresiba dosing from 31 units BID to 8 units BID. She reports that pre-pregnancy she was also doing a sliding scale for novolog (1 unit for every 25 units over 150). Since then she has not received any further insulin and post  has been having steady glucose readings in the 90-range per her home glucose meter on her arm.               Past Medical History:     Past Medical History:   Diagnosis Date     Anemia     Beta thalassemia     Diabetes mellitus (H)     Type 1 diabetes     Herpes simplex without mention of complication      Thyroid disease      Ulcerative colitis (H)           Past Surgical History:     Past Surgical History:   Procedure Laterality Date      SECTION N/A 2015    Procedure:  SECTION;  Surgeon: Kim Luke MD;  Location: [facility]            Social History:     Social History   Substance Use Topics     Smoking status: Never Smoker     Smokeless tobacco: Never Used     " "Alcohol use No          Family History:   Family Hx fully reviewed and is non contributory to this admission.             Allergies:     Allergies   Allergen Reactions     Shellfish Allergy Anaphylaxis     Swelling, dizziness, throat swelling     Asacol [Mesalamine]      Balsalazide Diarrhea     Diarrhea, Abdominal cramping     Cortenema [Hydrocortisone]      Severe lower extremity edema             Medications:     Prior to Admission medications    Medication Sig Last Dose Taking? Auth Provider   ASPIRIN PO Take 81 mg by mouth daily Past Week at Unknown time Yes Reported, Patient   Insulin Aspart (NOVOLOG SC)  11/5/2018 at 0100 Yes Reported, Patient   Insulin Degludec (TRESIBA FLEXTOUCH SC) Inject 31 Units Subcutaneous 2 times daily 11/4/2018 at 2100 Yes Reported, Patient   Levothyroxine Sodium (SYNTHROID PO) Take 88 mcg by mouth daily 11/4/2018 at Unknown time Yes Reported, Patient   Mercaptopurine (PURINETHOL PO) Take 50 mg by mouth daily Take 2 tablets daily 11/4/2018 at Unknown time Yes Reported, Patient   Vedolizumab (ENTYVIO IV) Every 8 weeks Past Month at Unknown time Yes Reported, Patient   acetone, Urine, test STRP    Reported, Patient   blood glucose test strip by In Vitro route daily Use to test blood sugar 3 times daily or as directed.   Reported, Patient   glucagon (GLUCAGEN) 1 MG SOLR 1 mg as needed for low blood sugar Unknown at Unknown time  Reported, Patient   insulin glargine (LANTUS) 100 UNIT/ML PEN Inject 40 Units Subcutaneous At Bedtime More than a month at Unknown time  Reported, Patient               Review of Systems:   A comprehensive greater than 10 system review of systems was carried out.  Pertinent positives and negatives are noted above.  Otherwise negative for contributory info.            Physical Exam:   Vitals were reviewed  Blood pressure 109/55, pulse 99, temperature 97.7  F (36.5  C), temperature source Oral, resp. rate 18, height 1.6 m (5' 3\"), weight 85.7 kg (189 lb), last " menstrual period 02/06/2018, SpO2 99 %, unknown if currently breastfeeding.  Exam:    GENERAL:  Comfortable.  PSYCH: pleasant, oriented, No acute distress.  HEENT:  PERRLA. Normal conjunctiva, normal hearing, nasal mucosa and Oropharynx are normal.  NECK:  Supple, no neck vein distention, adenopathy or bruits, normal thyroid.  HEART:  Normal S1, S2 with no murmur, no pericardial rub, S3 or S4.  LUNGS:  Clear to auscultation, normal Respiratory effort.  ABDOMEN:  Soft, no hepatosplenomegaly, normal bowel sounds.  EXTREMITIES:  No pedal edema, +2 pulses bilateral and equal.   SKIN:  Dry to touch, No rash, wound or ulcerations.  NEUROLOGIC:  Nonfocal with normal cranial nerve and motor power and sensation.            Data:   Past 24 hours labs, studies, and imaging were reviewed.  Results for orders placed or performed during the hospital encounter of 11/05/18   Anti Treponema   Result Value Ref Range    Treponema pallidum Antibody non reactive    Hepatitis B surface antigen   Result Value Ref Range    Hep B Surface Agn non reactive    HIV Antigen Antibody Combo   Result Value Ref Range    HIV Antigen Antibody Combo non reactive    Rubella Antibody IgG Quantitative   Result Value Ref Range    Rubella Antibody IgG Quantitative immune IU/mL   Glucose by meter   Result Value Ref Range    Glucose 94 70 - 99 mg/dL   Group B strep PCR   Result Value Ref Range    Group B Strep PCR positive        Nataliia Torres PA-C    Pt discussed with Dr. Welch who agrees with the care as discussed above.

## 2018-11-05 NOTE — PHARMACY-ADMISSION MEDICATION HISTORY
Admission medication history interview status for this patient is complete. See [facility] admission navigator for allergy information, prior to admission medications and immunization status.     Medication history interview source(s):Patient - unable to interview patient in person, patient called and briefly completed medication history over the phone when patient was available.  Medication history resources (including written lists, pill bottles, clinic record):None    Changes made to PTA medication list:  Added: none  Deleted: Aspirin(therapy completed now that pt has delivered), Lantus(was on first pregnancy, has not been on for 2 years)  Changed: Mercaptopurine(100mg daily), Tresiba - Previously had been 31 units BID while pregnant - planning on taking 10 units BID after delivery.  Novolog - Previously had been taking 100-120 units/daily while pregant but BG had been very variable, planning to take 1 unit for every 25 points of blood glucose over 150; and takes 1 unit/per 3 grams of carbohydrates after delivery.    Actions taken by pharmacist (provider contacted, etc):None     Additional medication history information:None    Medication reconciliation/reorder completed by provider prior to medication history? Yes    Do you take OTC medications (eg tylenol, ibuprofen, fish oil, eye/ear drops, etc)? N(Y/N)    For patients on insulin therapy: Y-see above  Lantus/levemir/NPH/Mix 70/30 dose:   (Y/N) (see Med list for doses)   Sliding scale Novolog Y/N  If Yes, do you have a baseline novolog pre-meal dose:  units with meals  Patients eat three meals a day:   Y/N    How many episodes of hypoglycemia do you have per week: _______  How many missed doses do you have per week: ______  How many times do you check your blood glucose per day: _______  Do you have a Continuous glucose monitor (CGM)   Y/N (remind pt that not approved for hospital use)   Any Barriers to therapy - Be specific :  cost of medications, comfortable with  giving injections (if applicable), comfortable and confident with current diabetes regimen: Y/N ______________      Prior to Admission medications    Medication Sig Last Dose Taking? Auth Provider   insulin aspart (NOVOLOG FLEXPEN) 100 UNIT/ML injection Takes 1 unit for every 25 points of blood glucose over 150; and takes 1 unit/per 3 grams of carbohydrates.  Yes Unknown, Entered By History   Insulin Degludec (TRESIBA FLEXTOUCH SC) Inject 10 Units Subcutaneous 2 times daily  11/4/2018 at 2100 - 31 units Yes Reported, Patient   Levothyroxine Sodium (SYNTHROID PO) Take 88 mcg by mouth daily 11/4/2018 at Unknown time Yes Reported, Patient   Mercaptopurine (PURINETHOL PO) Take 100 mg by mouth daily  11/4/2018 at Unknown time Yes Reported, Patient   Vedolizumab (ENTYVIO IV) Every 8 weeks Past Month at Unknown time Yes Reported, Patient

## 2018-11-05 NOTE — PLAN OF CARE
Pt admitted to MAC # 1 for a scheduled repeat . History reviewed and assessment completed. EFM monitors explained and placed x's 2. Baseline  moderate variability with accelerations and FM present. Informed consent was signed in the clinic, signature was verified. Dr Menjivar came to the bedside, reviewed the procedure, all questions were answered. Morning glucose is 92. Prepped for surgery.

## 2018-11-05 NOTE — OR NURSING
Returned to MAC # 1 @ 0836 following a repeat . Incision is covered with an Island dressing which is dry and intact. Fundus is firm @ u/u with scant to small rubra. Did complain of facial itching which resolved with Nubain 5 mg IV. Denies any pain or nausea.

## 2018-11-05 NOTE — IP AVS SNAPSHOT
[facility]    [address]    [facility] [address]    Phone:  [phone]    Fax:  [phone]                                       After Visit Summary   11/5/2018    Courtney Sparks    MRN: [id number]           After Visit Summary Signature Page     I have received my discharge instructions, and my questions have been answered. I have discussed any challenges I see with this plan with the nurse or doctor.    ..........................................................................................................................................  Patient/Patient Representative Signature      ..........................................................................................................................................  Patient Representative Print Name and Relationship to Patient    ..................................................               ................................................  Date                                   Time    ..........................................................................................................................................  Reviewed by Signature/Title    ...................................................              ..............................................  Date                                               Time          22EPIC Rev 08/18

## 2018-11-06 LAB
ALBUMIN SERPL-MCNC: 2.1 G/DL (ref 3.4–5)
ALP SERPL-CCNC: 74 U/L (ref 40–150)
ALT SERPL W P-5'-P-CCNC: 25 U/L (ref 0–50)
ANION GAP SERPL CALCULATED.3IONS-SCNC: 5 MMOL/L (ref 3–14)
AST SERPL W P-5'-P-CCNC: 33 U/L (ref 0–45)
BASOPHILS # BLD AUTO: 0 10E9/L (ref 0–0.2)
BASOPHILS NFR BLD AUTO: 0.1 %
BILIRUB SERPL-MCNC: 0.6 MG/DL (ref 0.2–1.3)
BUN SERPL-MCNC: 5 MG/DL (ref 7–30)
CALCIUM SERPL-MCNC: 7.9 MG/DL (ref 8.5–10.1)
CHLORIDE SERPL-SCNC: 107 MMOL/L (ref 94–109)
CO2 SERPL-SCNC: 27 MMOL/L (ref 20–32)
CREAT SERPL-MCNC: 0.62 MG/DL (ref 0.52–1.04)
DIFFERENTIAL METHOD BLD: ABNORMAL
EOSINOPHIL # BLD AUTO: 0.1 10E9/L (ref 0–0.7)
EOSINOPHIL NFR BLD AUTO: 0.9 %
ERYTHROCYTE [DISTWIDTH] IN BLOOD BY AUTOMATED COUNT: 15.9 % (ref 10–15)
GFR SERPL CREATININE-BSD FRML MDRD: >90 ML/MIN/1.7M2
GLUCOSE SERPL-MCNC: 82 MG/DL (ref 70–99)
HCT VFR BLD AUTO: 26.6 % (ref 35–47)
HGB BLD-MCNC: 8.4 G/DL (ref 11.7–15.7)
IMM GRANULOCYTES # BLD: 0.1 10E9/L (ref 0–0.4)
IMM GRANULOCYTES NFR BLD: 0.4 %
LYMPHOCYTES # BLD AUTO: 1.4 10E9/L (ref 0.8–5.3)
LYMPHOCYTES NFR BLD AUTO: 9.4 %
MCH RBC QN AUTO: 24.6 PG (ref 26.5–33)
MCHC RBC AUTO-ENTMCNC: 31.6 G/DL (ref 31.5–36.5)
MCV RBC AUTO: 78 FL (ref 78–100)
MONOCYTES # BLD AUTO: 0.8 10E9/L (ref 0–1.3)
MONOCYTES NFR BLD AUTO: 5.4 %
NEUTROPHILS # BLD AUTO: 12.6 10E9/L (ref 1.6–8.3)
NEUTROPHILS NFR BLD AUTO: 83.8 %
NRBC # BLD AUTO: 0 10*3/UL
NRBC BLD AUTO-RTO: 0 /100
PLATELET # BLD AUTO: 296 10E9/L (ref 150–450)
POTASSIUM SERPL-SCNC: 3.9 MMOL/L (ref 3.4–5.3)
PROT SERPL-MCNC: 6.1 G/DL (ref 6.8–8.8)
RBC # BLD AUTO: 3.42 10E12/L (ref 3.8–5.2)
SODIUM SERPL-SCNC: 139 MMOL/L (ref 133–144)
WBC # BLD AUTO: 15 10E9/L (ref 4–11)

## 2018-11-06 PROCEDURE — 12000029 ZZH R&B OB INTERMEDIATE

## 2018-11-06 PROCEDURE — 25000132 ZZH RX MED GY IP 250 OP 250 PS 637: Performed by: ANESTHESIOLOGY

## 2018-11-06 PROCEDURE — 80053 COMPREHEN METABOLIC PANEL: CPT | Performed by: OBSTETRICS & GYNECOLOGY

## 2018-11-06 PROCEDURE — 85025 COMPLETE CBC W/AUTO DIFF WBC: CPT | Performed by: OBSTETRICS & GYNECOLOGY

## 2018-11-06 PROCEDURE — 99232 SBSQ HOSP IP/OBS MODERATE 35: CPT | Performed by: HOSPITALIST

## 2018-11-06 PROCEDURE — 25000132 ZZH RX MED GY IP 250 OP 250 PS 637: Performed by: PHYSICIAN ASSISTANT

## 2018-11-06 PROCEDURE — 25000132 ZZH RX MED GY IP 250 OP 250 PS 637: Performed by: OBSTETRICS & GYNECOLOGY

## 2018-11-06 PROCEDURE — 36415 COLL VENOUS BLD VENIPUNCTURE: CPT | Performed by: OBSTETRICS & GYNECOLOGY

## 2018-11-06 PROCEDURE — 85018 HEMOGLOBIN: CPT | Performed by: OBSTETRICS & GYNECOLOGY

## 2018-11-06 PROCEDURE — 40000083 ZZH STATISTIC IP LACTATION SERVICES 1-15 MIN

## 2018-11-06 RX ORDER — MERCAPTOPURINE 50 MG/1
50 TABLET ORAL 2 TIMES DAILY
Status: DISCONTINUED | OUTPATIENT
Start: 2018-11-06 | End: 2018-11-06

## 2018-11-06 RX ORDER — MERCAPTOPURINE 50 MG/1
100 TABLET ORAL DAILY
Status: DISCONTINUED | OUTPATIENT
Start: 2018-11-07 | End: 2018-11-08 | Stop reason: HOSPADM

## 2018-11-06 RX ORDER — FERROUS SULFATE 325(65) MG
325 TABLET ORAL
Status: DISCONTINUED | OUTPATIENT
Start: 2018-11-06 | End: 2018-11-08 | Stop reason: HOSPADM

## 2018-11-06 RX ADMIN — ACETAMINOPHEN 975 MG: 325 TABLET, FILM COATED ORAL at 06:42

## 2018-11-06 RX ADMIN — MERCAPTOPURINE 50 MG: 50 TABLET ORAL at 08:44

## 2018-11-06 RX ADMIN — INSULIN ASPART 8 UNITS: 100 INJECTION, SOLUTION INTRAVENOUS; SUBCUTANEOUS at 08:46

## 2018-11-06 RX ADMIN — ACETAMINOPHEN 975 MG: 325 TABLET, FILM COATED ORAL at 14:15

## 2018-11-06 RX ADMIN — OXYCODONE HYDROCHLORIDE 5 MG: 5 TABLET ORAL at 12:05

## 2018-11-06 RX ADMIN — INSULIN DEGLUDEC INJECTION 8 UNITS: 100 INJECTION, SOLUTION SUBCUTANEOUS at 08:46

## 2018-11-06 RX ADMIN — OXYCODONE HYDROCHLORIDE 5 MG: 5 TABLET ORAL at 05:07

## 2018-11-06 RX ADMIN — SENNOSIDES AND DOCUSATE SODIUM 2 TABLET: 8.6; 5 TABLET ORAL at 18:42

## 2018-11-06 RX ADMIN — ACETAMINOPHEN 975 MG: 325 TABLET, FILM COATED ORAL at 22:32

## 2018-11-06 RX ADMIN — OXYCODONE HYDROCHLORIDE 5 MG: 5 TABLET ORAL at 22:54

## 2018-11-06 RX ADMIN — OXYCODONE HYDROCHLORIDE 5 MG: 5 TABLET ORAL at 18:42

## 2018-11-06 RX ADMIN — LEVOTHYROXINE SODIUM 88 MCG: 88 TABLET ORAL at 08:48

## 2018-11-06 ASSESSMENT — ACTIVITIES OF DAILY LIVING (ADL)
AMBULATION: 0-->INDEPENDENT
DRESS: 0-->INDEPENDENT
TOILETING: 0-->INDEPENDENT
RETIRED_COMMUNICATION: 0-->UNDERSTANDS/COMMUNICATES WITHOUT DIFFICULTY
COGNITION: 0 - NO COGNITION ISSUES REPORTED
FALL_HISTORY_WITHIN_LAST_SIX_MONTHS: NO
TRANSFERRING: 0-->INDEPENDENT
RETIRED_EATING: 0-->INDEPENDENT
SWALLOWING: 0-->SWALLOWS FOODS/LIQUIDS WITHOUT DIFFICULTY
BATHING: 0-->INDEPENDENT

## 2018-11-06 NOTE — PLAN OF CARE
Problem: Patient Care Overview  Goal: Plan of Care/Patient Progress Review  Outcome: Improving  Patient meeting expected goals. Is up independent in room, meeting all personal and infant needs. VSS. Pain is being managed with Tylenol and Ibuprofen and patient is aware that Oxycodone is available PRN.  Incision to low abdomen is with island dressing, no drainage present and no signs of infection to surrounding tissue.  Patient would like to shower this evening to which after, dressing will be removed by nurse using sterile technique. Is more bottle feeding infant currently, but is also doing some pumping and would give EBM to infant if had any results.  Patient states she would like to try breastfeeding for next feeding and then supplement as needed after with formula via bottle.  Writer remains available and supportive of patient's plan for feeding infant. Bonding well with infant.

## 2018-11-06 NOTE — PLAN OF CARE
Problem: Patient Care Overview  Goal: Plan of Care/Patient Progress Review  Outcome: Improving  Pt stable and meeting expected goals. VSS. Lepe removed and pt ambulated to the bathroom this AM; tolerated well. Able to void about 100 mL when up. Pain managed with tylenol and oxycodone this shift. Pt checking own glucose & reported it being a little lower overnight; requested juice and crackers; not symptomatic. Bottle feeding and pumping while baby is having lower blood sugars but hopes to continue breastfeeding once sugars stabilize. Pt and spouse bonding well with baby. Continue to monitor.

## 2018-11-06 NOTE — LACTATION NOTE
"LC follow up.  Patient has been formula feeding infant due to blood sugar instability.  She is no longer placing infant to breast.  She has a breastpump at bedside and has been occasionally pumping but not getting results and feels frustrated with process.  She also reports that pumping \"hurts\".  LC discussed the normal process of lactation and pumping expectations as well as encouraged her to try the larger, 27 mm,  flange size if the 24 mm has been painful.  She was reassured that we will support her in her breastfeeding goals and if she decides she would like to continue to latch baby occasionally, she can even schedule a consult as an OP.  "

## 2018-11-06 NOTE — PLAN OF CARE
Pt tearful upon RN entry into room.  RN asked if she would like to discuss; pt states she is just emotional re: infant feedings. Pt reports staff have been very supportive of her feeding decisions and she is grateful of that support. RN discussed benefit of continuing to pump if she wants to provide breast milk to baby but that if her preference is to exclusively formula feed, she can discontinue pumping. Pt and  verbalize understanding.

## 2018-11-06 NOTE — PLAN OF CARE
Problem: Patient Care Overview  Goal: Plan of Care/Patient Progress Review  Outcome: No Change  Pt's incisional pain is controlled with pharmacological interventions and rest, was pumping occasionally at shift, no milk yet, feeding her infant with formula; ambulated in room and later at shift in the hallway; has voided adequate amount of urine, SL is out, her HGB is at 8.4, asymptomatic, refused iron tab; stable blood glucose at shift; pt has her own glucometer and strongly advised pt to call nurse when she feels blood glucose needs to be checked; pharmacy has called regarding pt's medications and adherence to it; all medications were reviewed carefully with pt and family and called dr Sanches per pharmacy recommendation to reevaluate, so the pt is on the desirable, therapeutic, schedule and dose. Pt appears to be happy and smiling, bonding with her infant girl well. Report and updates are given to Rocio BOJORQUEZ RN, continue to monitor.

## 2018-11-06 NOTE — PLAN OF CARE
Problem: Patient Care Overview  Goal: Plan of Care/Patient Progress Review  Outcome: Improving  VS stable. Eating and drinking, tolerating regular diet well. IV infusing. Lepe catheter patent and draining adequate amount of urine. Dressing dry, clean, intact. Blood sugar before dinner 76, 5 units of Novolog self administered (per patient). Pt taking oxycodone and tylenol for pain. Positive attachment behavior observed with infant.  present and supportive. Continue to monitor.       Pt walked to the bathroom x1. Tolerated well. BS at 2000 = 134 (per patient).

## 2018-11-06 NOTE — PROVIDER NOTIFICATION
11/06/18 1315   Provider Notification   Provider Name/Title Dr Sanches   Method of Notification Phone   Request Evaluate-Remote   Notification Reason Medication Request   Called Dr Sanches per pharmacy recommendation to reevaluate pt's orders for medications, so medications would be administered correctly and in accordance with pt.

## 2018-11-06 NOTE — PROVIDER NOTIFICATION
"   11/06/18 1000   Provider Notification   Provider Name/Title Dr Sanches   Method of Notification In Department   Request Evaluate-Remote   Notification Reason Other   Updated dr Sanches about pt's iron tab refusal since she stated \"not allowed to have one\"  wanted to consult with hospitalist._  "

## 2018-11-06 NOTE — PROGRESS NOTES
[facility]    Hospitalist Progress Note  Name: Courtney Sparks    MRN: [id number]  Provider:  Angel Chaudhari DO, MPH  Date of Service: 2018    Summary of Stay: This patient is a 33 year old female with a PMH significant for DM I, beta thalassemia, ulcerative colitis, hypothyroidism migraine headaches who is POD 1 s/p scheduled repeat  section.     1. IUP s/p : Doing well, no current complaints. DVT Prophylaxis: on PCDs as per primary. D/C planning. To home on Thursday.     2. DM I: Longstanding type 1 diabetic; A1c's have been in 5-range throughout pregnancy. She is currently wearing a glucose monitoring meter ([employer] system) on her arm and reports her blood glucose levels have been steadily in the 90s since . Patient's endocrinologist Eddie Riley ([facility]).   -Patient reports being very comfortable managing her blood sugars rather independently  -She indicates that her endocrinologist will loosen up on her tight glycemic control now that she has successfully delivered  -Per endocrinologist's recommendations s/p C/S, continue Tresiba 8 units BID and resumed pre-pregnancy mealtime sliding scale insulin (self-correction only)  -Endocrine also recommended glucose checks Q2H.      3. Beta thalassemia: Diagnosed because her father and brother have this diagnosis as well. She personally has had no bleeding or clotting issues previously. Hgb yesterday pre-op was 10.1. No known active bleeding, lightheadedness, dyspnea, acute leg swelling or extremity pain.  Hemoglobin this morning is 8.1.  No indication for transfusion  -Ok to resume PTA aspirin when deemed safe by primary care team  -Continue PCDs     4. Ulcerative colitis: Continue PTA mercaptopurine 50 mg BID. She is also on vedolizumab infusions Q8 weeks that will be resumed on discharge.     5. Hypothyroidism: Continue PTA levothyroxine 88 mcg daily.    DVT Prophylaxis: Defer to primary service  Code Status:  No Order  Disposition: Expected discharge in 2 days to home. Goals prior to discharge include per OBGYN.   Incidental Findings: None.  Family updated today: Yes.    Thank you for the consultation of this patient.  We will continue to follow along during the hospitalization.  Please page with any questions or concerns.     Interval History   Assumed care from previous hospitalist. The history was fully reviewed.  The patient reports doing well. No chest pain or shortness of breath. No nausea, vomiting, diarrhea, constipation. No fevers. No other specific complaints identified. BG well controlled.    -Data reviewed today: I personally reviewed all new labs and imaging results over the last 24 hours.     Physical Exam   Temp: 98.2  F (36.8  C) Temp src: Oral BP: 107/60 Pulse: 86   Resp: 18 SpO2: 95 %      Vitals:    11/05/18 0602   Weight: 85.7 kg (189 lb)     Vital Signs with Ranges  Temp:  [97.3  F (36.3  C)-98.6  F (37  C)] 98.2  F (36.8  C)  Pulse:  [80-86] 86  Resp:  [16-18] 18  BP: (103-131)/(50-72) 107/60  SpO2:  [95 %-100 %] 95 %  I/O last 3 completed shifts:  In: 2476.8 [P.O.:268; I.V.:2208.8]  Out: 3700 [Urine:3700]    GENERAL: No apparent distress. Awake, alert, and fully oriented.  HEENT: Normocephalic, atraumatic. Extraocular movements intact.  CARDIOVASCULAR: Regular rate and rhythm without murmurs or rubs. No S3.  PULMONARY: Clear bilaterally.  GASTROINTESTINAL: Soft, non-tender, non-distended. Bowel sounds normoactive.   EXTREMITIES: No cyanosis or clubbing. No edema.  NEUROLOGICAL: CN 2-12 grossly intact, no focal neurological deficits.  DERMATOLOGICAL: No rash, ulcer, bruising, nor jaundice.     Medications     dextrose 5% lactated ringers 125 mL/hr at 11/05/18 1652     - MEDICATION INSTRUCTIONS -       lactated ringers       - MEDICATION INSTRUCTIONS -       NO opioid for 24 hours after intrathecal morphine PF (ASTRAMORPH PF) injection, or 16 hours for intrathecal HYDROmorphone (DILAUDID) unless  Anesthesia notified first. Call Anesthesia pain service if patient having uncontrolled pain.       NO Rho (D) immune globulin (RhoGam) needed - mother Rh POSITIVE       - MEDICATION INSTRUCTIONS -       - MEDICATION INSTRUCTIONS -       oxytocin in 0.9% NaCl 100 mL/hr (11/05/18 1145)     oxytocin in 0.9% NaCl         acetaminophen  975 mg Oral Q8H     ferrous sulfate  325 mg Oral Daily with breakfast     insulin aspart  1-6 Units Subcutaneous TID w/meals     insulin degludec  8 Units Subcutaneous QAM     insulin degludec  8 Units Subcutaneous At Bedtime     levothyroxine (SYNTHROID/LEVOTHROID) tablet 88 mcg  88 mcg Oral Daily     mercaptopurine (PURINETHOL) tablet CHEMO 50 mg  50 mg Oral BID     scopolamine  1 each Transdermal Once     sodium chloride (PF)  3 mL Intracatheter Q8H     Data     Laboratory:    Recent Labs  Lab 11/06/18  0645 11/04/18  0915   WBC 15.0*  --    HGB 8.4* 10.1*   HCT 26.6*  --    MCV 78  --      --        Recent Labs  Lab 11/06/18  0645      POTASSIUM 3.9   CHLORIDE 107   CO2 27   ANIONGAP 5   GLC 82   BUN 5*   CR 0.62   GFRESTIMATED >90   GFRESTBLACK >90   OANH 7.9*       Recent Labs  Lab 11/06/18  0645 11/05/18  0635   GLC 82  --    BGM  --  94     No results for input(s): CULT in the last 168 hours.    Imaging:  No results found for this or any previous visit (from the past 24 hour(s)).      Angel Chaudhari DO MPH  [facility] Hospitalist  [address] [place] [address]  Pager: [phone]  11/06/2018

## 2018-11-06 NOTE — OP NOTE
Procedure Date: 2018      DATE OF SURGERY:  18      SURGEON:  Brayden Menjivar MD; Anita Jaffe DO.      PREOPERATIVE DIAGNOSIS:  Repeat  section at 39 weeks gestation; pre-gestational diabetes mellitus.      POSTOPERATIVE DIAGNOSES:  Repeat  section at 39 weeks gestation; pre-gestational diabetes mellitus.      PROCEDURE:  Repeat low segment transverse  section.      INDICATIONS FOR SURGERY:  The patient is a 33-year-old white female,  2, para 1102, with LMP 18 and MONTSE 18, who was admitted for repeat  section at term.  The patient had pre-existing type 1 diabetes, hypothyroidism, and beta thalassemia.  She was followed for these problems with consultants during the course of the pregnancy and had continued surveillance with perinatology throughout the pregnancy.  On the recommendations of Perinatology a decision was made to perform repeat  section at 39 weeks' gestation.  Potential risks and complications were reviewed and written consent was obtained.      OPERATIVE FINDINGS:   1.  The patient delivered a 7-pound, 9-ounce female infant with Apgars of 8 at 1 minute and 9 at 5 minutes from ROP presentation.   2.  Multiple unsuccessful attempts at vacuum-assisted delivery followed by successful vacuum-assisted delivery with Mityvac.   3.  Normal uterus and adnexa to inspection.      DESCRIPTION OF PROCEDURE:  After obtaining adequate spinal anesthesia the patient was placed in the LLD position and Lepe catheter inserted.  Vaginal and abdominal fields were prepped and draped in the usual sterile manner.  The previous transverse scar was incised and carried through the underlying subcutaneous layers to the level of deep fascia.  Hemostasis in these layers was also judged adequate.  The fascial layer was divided to the same extent as the skin incision.  A plane is developed over the bodies of rectus abdominis muscles superior to the umbilicus and  inferiorly to the pubic symphysis.  In the course of performing this dissection, entry into the peritoneal space was achieved.  The bladder flap was sharply dissected free from the anterior aspect of the uterus and protected with retractor.  Transverse incision was made across the anterior aspect of the lower uterine segment and instruments were removed from the operative field.  Fluid was allowed to dissipate.  Attempt at delivery without the vacuum was unsuccessful.  The vacuum was applied several times, resulting in pop-offs and unsuccessful attempts at delivery.  A switch was made to the Mityvac and delivery was accomplished without further incident.      Time from incision into the uterus to delivery was approximately 7 minutes.  The remainder of the fetal body was delivered without difficulty.  The cord was doubly clamped and interrupted between the clamps.  A segment of cord was sent for blood gases.  The infant was handed off to the resuscitation team on hand.  The patient then received intravenous oxytocin.  Prophylactic antibiotics had been provided before surgery.  The endometrial cavity was wiped free of secundines.  The myometrial defect was repaired in 2 layers.  The first was a running locked suture of #1 Vicryl and the second was a vertical imbricating stitch of 0 Monocryl.  Hemostasis was judged adequate.  The cul-de-sac and gutters were copiously irrigated and returns were clear.  Instruments were removed from the peritoneal space and the uterus was returned to the peritoneal cavity.  The overlying fascia was inspected for hemostasis.  This was judged adequate.  This layer was closed with 2 running sutures of 0 Vicryl meeting in the midline.  Dr. Jaffe was excused from the surgery at this point.  The subcutaneous layer was irrigated with saline and returns were clear.  This layer was approximated with running suture of 2-0 Vicryl.  The skin was then closed with a series of surgical staples and a  sterile dressing was applied.  Estimated blood loss was 900 mL.  Results of the blood gases were pending at the time of this dictation.  The patient left the operating room in stable condition.         STACIE MENJIVAR MD             D: 2018   T: 2018   MT: MARIOLA      Name:     FERMIN GUY   MRN:      [id number]        Account:        [id number]   :      1985           Procedure Date: 2018      Document: [id number]       cc: Stacie Menjivar MD

## 2018-11-06 NOTE — CONSULTS
"BRIEF NUTRITION ASSESSMENT      REASON FOR ASSESSMENT:  Nutrition admission risk screen - new/uncontrolled diabetes    NUTRITION HISTORY:  - Patient with type 1 DM. No A1c value on file.   - Presented for repeat     CURRENT DIET AND INTAKE:  Diet: Regular         No s/sx intolerance reported.     ANTHROPOMETRICS:  Height: 5' 3\"  Weight: 189 lbs 0 oz (85.7 kg)   Body mass index is 33.48 kg/(m^2).  Weight Status:  Obesity Grade I BMI 30-34.9    LABS:  Labs reviewed    Recent Labs  Lab 18  0645 18  0635   GLC 82  --    BGM  --  94         MALNUTRITION:  Patient does not meet two of the following criteria necessary for diagnosing malnutrition: significant weight loss, reduced intake, subcutaneous fat loss, muscle loss or fluid retention    NUTRITION INTERVENTION:  Nutrition Diagnosis:  No nutrition diagnosis at this time.    Implementation:  Nutrition Education:  Recommend outpatient referral if needed.     FOLLOW UP/MONITORING:   Will re-evaluate in 7 - 10 days, or sooner, if re-consulted.      Nhung Lew RD, LD  3rd floor/ICU: [phone]  All other floors: [phone]  Weekend/holiday: [phone]  Office: [phone]      "

## 2018-11-06 NOTE — PROGRESS NOTES
"[facility] Postop C/S Note    S:  Patient without complaints other than typical soreness.  Minimal lochia.  Flatus passed, tolerating Clear liquids diet well    O:  Blood pressure 107/60, pulse 86, temperature 98.2  F (36.8  C), temperature source Oral, resp. rate 18, height 1.6 m (5' 3\"), weight 85.7 kg (189 lb), last menstrual period 02/06/2018, SpO2 95 %, unknown if currently breastfeeding.          Intake/Output Summary (Last 24 hours) at 11/06/18 0750  Last data filed at 11/06/18 0635   Gross per 24 hour   Intake           2476.8 ml   Output             3700 ml   Net          -1223.2 ml           Abdomen - Non-distended, Normoactive bowel sounds, soft, appropriately tender; Fundus firm, at umbilicus, nontender        Dressing/Incision - clean, dry, intact        Extremities - No calf tenderness    Hemoglobin   Date Value Ref Range Status   11/06/2018 8.4 (L) 11.7 - 15.7 g/dL Final   11/04/2018 10.1 (L) 11.7 - 15.7 g/dL Final   ]      A:   Postop Day# 1, s/p Repeat LTCS - doing well        Postop anemia - due to typical acute intraop blood loss, no sx's        Type 1 IDDM - managed by hospitalist        Ulcerative colitis - managed by hospitalist, on Mercaptopurine, no sx's        Hypothyroidism - stable on Synthroid      P:  1)  Routine postop care        2)  Start Fe every day        3)  Lepe out, ambulate, adv diet        4)  Probable D/C in 2 days      Jonathan Sanches MD            "

## 2018-11-07 LAB
GLUCOSE BLDC GLUCOMTR-MCNC: 108 MG/DL (ref 70–99)
GLUCOSE BLDC GLUCOMTR-MCNC: 45 MG/DL (ref 70–99)

## 2018-11-07 PROCEDURE — 12000027 ZZH R&B OB

## 2018-11-07 PROCEDURE — 25000132 ZZH RX MED GY IP 250 OP 250 PS 637: Performed by: ANESTHESIOLOGY

## 2018-11-07 PROCEDURE — 25000132 ZZH RX MED GY IP 250 OP 250 PS 637: Performed by: OBSTETRICS & GYNECOLOGY

## 2018-11-07 PROCEDURE — 00000146 ZZHCL STATISTIC GLUCOSE BY METER IP

## 2018-11-07 PROCEDURE — 99232 SBSQ HOSP IP/OBS MODERATE 35: CPT | Performed by: INTERNAL MEDICINE

## 2018-11-07 RX ADMIN — ACETAMINOPHEN 975 MG: 325 TABLET, FILM COATED ORAL at 15:42

## 2018-11-07 RX ADMIN — ACETAMINOPHEN 975 MG: 325 TABLET, FILM COATED ORAL at 23:55

## 2018-11-07 RX ADMIN — OXYCODONE HYDROCHLORIDE 5 MG: 5 TABLET ORAL at 15:42

## 2018-11-07 RX ADMIN — OXYCODONE HYDROCHLORIDE 5 MG: 5 TABLET ORAL at 03:22

## 2018-11-07 RX ADMIN — SENNOSIDES AND DOCUSATE SODIUM 1 TABLET: 8.6; 5 TABLET ORAL at 20:26

## 2018-11-07 RX ADMIN — OXYCODONE HYDROCHLORIDE 5 MG: 5 TABLET ORAL at 20:26

## 2018-11-07 RX ADMIN — SENNOSIDES AND DOCUSATE SODIUM 2 TABLET: 8.6; 5 TABLET ORAL at 08:51

## 2018-11-07 RX ADMIN — LEVOTHYROXINE SODIUM 88 MCG: 88 TABLET ORAL at 07:20

## 2018-11-07 RX ADMIN — ACETAMINOPHEN 975 MG: 325 TABLET, FILM COATED ORAL at 07:20

## 2018-11-07 NOTE — PLAN OF CARE
Problem: Patient Care Overview  Goal: Plan of Care/Patient Progress Review  Outcome: No Change  Pt is ambulating in room, independent with cares. Voiding, taking in good amounts of fluids. Fundus firm. Feeding baby formula. Pt is managing type 1 diabetes on own currently with own monitors. MD has seen pt along with pharmacy to discuss management. Encouraged pt to let nurse know when wants to check a BG. Bonding well with baby.

## 2018-11-07 NOTE — PROGRESS NOTES
Patient unavailable when Public Health Nurse (PHN) stopped by to discuss [facility] resources.

## 2018-11-07 NOTE — PLAN OF CARE
Problem: Patient Care Overview  Goal: Plan of Care/Patient Progress Review  Outcome: Improving    Report that writer was given at beginning of shift was that this patient had been seen by pharmacy again today and they desired more blood sugar checks on record. Patient had agreed to two being taken today, and with our acu reader being used.  Patient still has insulin pens at bedside in lock box (which pharmacy brought up yesterday afternoon and writer issued to patient, demonstrated how to use and patient was able to open without difficulty). Patient has key to box and box is in bedside table. Patient does let nursing know her carb count and how many units she will be giving herself before meals and at bedtime and nursing scans scheduled pen being used. Patient is formula feeding infant and may choose to still bring infant to breast at times.  Bonding well with infant and performing all cares. Patient's VSS. Pain is being managed with Tylenol and Oxycodone. Incision to low abdomen is with staples, well approximated, no drainage or signs of infection noted.

## 2018-11-07 NOTE — PROGRESS NOTES
[facility]    Hospitalist Progress Note  Name: Courtney Sparks    MRN: [id number]  Provider:  Rosalie Diaz MD   Date of Service: 2018    Summary of Stay: This patient is a 33 year old female with a PMH significant for DM I, beta thalassemia, ulcerative colitis, hypothyroidism migraine headaches who is s/p scheduled repeat  section on .     1. IUP s/p : Doing well, no current complaints. DVT Prophylaxis: on PCDs as per primary. D/C planning. To home on Thursday.     2. DM I: Longstanding type 1 diabetic; A1c's have been in 5-range throughout pregnancy. She is currently wearing a glucose monitoring meter ([employer] system) on her arm and reports her blood glucose levels have been steadily in the 90s since . Patient's endocrinologist dEdie Riley ([facility]).  She did have hypoglycemia this morning (had symptoms).  Tresiba dose decreased.  -Patient reports being very comfortable managing her blood sugars rather independently  -She indicates that her endocrinologist will loosen up on her tight glycemic control now that she has successfully delivered  -Per endocrinologist's recommendations s/p C/S resumed pre-pregnancy mealtime sliding scale insulin (self-correction only)  -Decrease Tresiba from 8 to 6 units BID   -Endocrine also recommended glucose checks Q2H.      3. Beta thalassemia: Diagnosed because her father and brother have this diagnosis as well. She personally has had no bleeding or clotting issues previously. Hgb yesterday pre-op was 10.1. No known active bleeding, lightheadedness, dyspnea, acute leg swelling or extremity pain.  Hemoglobin yesterday morning is 8.4.  No indication for transfusion  -Ok to resume PTA aspirin when deemed safe by primary care team  -Continue PCDs     4. Ulcerative colitis: Continue PTA mercaptopurine 50 mg BID. She is also on vedolizumab infusions Q8 weeks that will be resumed on discharge.     5. Hypothyroidism:  Continue PTA levothyroxine 88 mcg daily.    DVT Prophylaxis: Defer to primary service  Code Status: No Order  Disposition: Expected discharge in 1 days to home. Goals prior to discharge include per OBGYN.   Incidental Findings: None.  Family updated today: Yes.    Thank you for the consultation of this patient.  We will continue to follow along during the hospitalization.  Please page with any questions or concerns.     Interval History   Assumed care from previous Hospitalist. The history was fully reviewed.  The patient is feeling well today.  She had hypoglycemia this morning (47) and had symptoms.  Now this has resolved.  Agreeable to dose change of Tresiba.  Eating well.  No nausea, emesis, CP or SOB.  Abdominal pain well controlled.      -Data reviewed today: I personally reviewed all new labs and imaging results over the last 24 hours.     Physical Exam   Temp: 98.1  F (36.7  C) Temp src: Oral BP: 113/76 Pulse: 82   Resp: 12        Vitals:    11/05/18 0602   Weight: 85.7 kg (189 lb)     Vital Signs with Ranges  Temp:  [97.9  F (36.6  C)-98.8  F (37.1  C)] 98.1  F (36.7  C)  Pulse:  [82-84] 82  Resp:  [12-18] 12  BP: (112-118)/(66-89) 113/76  I/O last 3 completed shifts:  In: -   Out: 660 [Urine:660]    GENERAL: No apparent distress. Awake, alert, and fully oriented.  HEENT: Normocephalic, atraumatic. Extraocular movements intact.  CARDIOVASCULAR: Regular rate and rhythm without murmurs or rubs. No S3.  PULMONARY: Clear bilaterally.  GASTROINTESTINAL: Soft, non-tender, non-distended. Bowel sounds normoactive. Abdominal Binder in place  EXTREMITIES: No cyanosis or clubbing. No edema.  NEUROLOGICAL: CN 2-12 grossly intact, no focal neurological deficits.  DERMATOLOGICAL: No rash, ulcer, bruising, nor jaundice.     Medications     dextrose 5% lactated ringers 125 mL/hr at 11/05/18 1652     lactated ringers       - MEDICATION INSTRUCTIONS -       NO Rho (D) immune globulin (RhoGam) needed - mother Rh POSITIVE        - MEDICATION INSTRUCTIONS -       - MEDICATION INSTRUCTIONS -       oxytocin in 0.9% NaCl 100 mL/hr (11/05/18 1145)     oxytocin in 0.9% NaCl         acetaminophen  975 mg Oral Q8H     ferrous sulfate  325 mg Oral Daily with breakfast     insulin aspart  1-10 Units Subcutaneous TID w/meals     insulin aspart   Subcutaneous TID AC     insulin degludec  6 Units Subcutaneous QAM     insulin degludec  6 Units Subcutaneous At Bedtime     levothyroxine (SYNTHROID/LEVOTHROID) tablet 88 mcg  88 mcg Oral Daily     mercaptopurine (PURINETHOL) tablet CHEMO 100 mg  100 mg Oral Daily     scopolamine  1 each Transdermal Once     Data     Laboratory:    Recent Labs  Lab 11/06/18 0645 11/04/18  0915   WBC 15.0*  --    HGB 8.4* 10.1*   HCT 26.6*  --    MCV 78  --      --        Recent Labs  Lab 11/06/18 0645      POTASSIUM 3.9   CHLORIDE 107   CO2 27   ANIONGAP 5   GLC 82   BUN 5*   CR 0.62   GFRESTIMATED >90   GFRESTBLACK >90   OANH 7.9*       Recent Labs  Lab 11/07/18  0635 11/06/18  0645 11/05/18  0635   GLC  --  82  --    BGM 45*  --  94     No results for input(s): CULT in the last 168 hours.    Imaging:  No results found for this or any previous visit (from the past 24 hour(s)).      Rosalie Diaz MD   11/07/2018

## 2018-11-07 NOTE — PROGRESS NOTES
OB/GYN Progress Note   Courtney Ayonwiec : 1985   Admission date: 2018  5:47 AM   Hospital day: 2   Post-operative day #2, status post Classic repeat  section     SUBJECTIVE:   Pt feels as though her recovery is going well. Her concerns include: None  Pain is well controlled.  She is ambulating and is tolerating a regular diet. No N/V.  GI function appropriate.  Lochia is stable.   She denies struggling with her mood. She denies suicidal or homicidal ideation.    Feeding: Breast     ROS:  The patient denies any chest pain, shortness of breath, excessive pain, fever, chills, purulent drainage from the wound, nausea or vomiting.  ?   OBJECTIVE:   All vitals stable  Patient Vitals for the past 12 hrs:   BP Temp Temp src Pulse Resp   18 0028 112/66 97.9  F (36.6  C) Oral 82 12     All laboratory data related to this surgery reviewed  Hemoglobin   Date Value Ref Range Status   2018 8.4 (L) 11.7 - 15.7 g/dL Final   2018 10.1 (L) 11.7 - 15.7 g/dL Final   2015 7.8 (L) 11.7 - 15.7 g/dL Final   2015 7.8 (L) 11.7 - 15.7 g/dL Final   2015 8.1 (L) 11.7 - 15.7 g/dL Final     No imaging studies have been ordered       GEN: Alert NAD   CHEST: CTAB   CV: RRR   ABD: Soft, appropriately tender, ND. Fundus is firm and is below the umbilicus.   INC: C/D/I   EXTREMITIES: nontender to palpation, none edema   ?   IMPRESSION:   Post Op Day 2: status post C/S.   Satisfactory progress, meeting anticipated milestones.   Postop anemia - due to typical acute intraop blood loss, no sx's    Type 1 IDDM - managed by hospitalist.  Sugars good.    Ulcerative colitis - managed by hospitalist, on Mercaptopurine, no sx's  Hypothyroidism - stable on Synthroid   PLAN:   - Continue standard postop care plan  - Continue Iron therapy  - Probable discharge tomorrow  ?   Angel Jimenez   Pager #: [phone]

## 2018-11-07 NOTE — PLAN OF CARE
Problem: Patient Care Overview  Goal: Plan of Care/Patient Progress Review  Outcome: Improving  VSS. Incision intact, no swelling or drainage present. BG WDL and monitored by pt. Pain controlled with Tylenol.

## 2018-11-08 VITALS
DIASTOLIC BLOOD PRESSURE: 83 MMHG | HEART RATE: 93 BPM | SYSTOLIC BLOOD PRESSURE: 124 MMHG | WEIGHT: 189 LBS | BODY MASS INDEX: 33.49 KG/M2 | RESPIRATION RATE: 12 BRPM | OXYGEN SATURATION: 95 % | HEIGHT: 63 IN | TEMPERATURE: 98.1 F

## 2018-11-08 PROCEDURE — 25000132 ZZH RX MED GY IP 250 OP 250 PS 637: Performed by: OBSTETRICS & GYNECOLOGY

## 2018-11-08 PROCEDURE — 25000132 ZZH RX MED GY IP 250 OP 250 PS 637: Performed by: ANESTHESIOLOGY

## 2018-11-08 PROCEDURE — 99207 ZZC CDG-MDM COMPONENT: MEETS LOW - DOWN CODED: CPT | Performed by: HOSPITALIST

## 2018-11-08 PROCEDURE — 99232 SBSQ HOSP IP/OBS MODERATE 35: CPT | Performed by: HOSPITALIST

## 2018-11-08 RX ORDER — FERROUS SULFATE 325(65) MG
325 TABLET ORAL 2 TIMES DAILY
Qty: 60 TABLET | Refills: 1 | Status: SHIPPED | OUTPATIENT
Start: 2018-11-08 | End: 2018-11-08

## 2018-11-08 RX ORDER — ACETAMINOPHEN 325 MG/1
650 TABLET ORAL EVERY 4 HOURS PRN
Qty: 50 TABLET | Refills: 1 | Status: SHIPPED | OUTPATIENT
Start: 2018-11-08

## 2018-11-08 RX ORDER — OXYCODONE HYDROCHLORIDE 5 MG/1
5 TABLET ORAL EVERY 4 HOURS PRN
Qty: 16 TABLET | Refills: 0 | Status: SHIPPED | OUTPATIENT
Start: 2018-11-08

## 2018-11-08 RX ORDER — FERROUS SULFATE 325(65) MG
325 TABLET ORAL 2 TIMES DAILY
Qty: 60 TABLET | Refills: 1 | Status: SHIPPED | OUTPATIENT
Start: 2018-11-08 | End: 2019-12-03

## 2018-11-08 RX ORDER — AMOXICILLIN 250 MG
1 CAPSULE ORAL 2 TIMES DAILY PRN
Qty: 30 TABLET | Refills: 1 | Status: SHIPPED | OUTPATIENT
Start: 2018-11-08

## 2018-11-08 RX ORDER — ACETAMINOPHEN 325 MG/1
650 TABLET ORAL EVERY 4 HOURS PRN
Qty: 50 TABLET | Refills: 1 | Status: SHIPPED | OUTPATIENT
Start: 2018-11-08 | End: 2018-11-08

## 2018-11-08 RX ORDER — AMOXICILLIN 250 MG
1 CAPSULE ORAL 2 TIMES DAILY PRN
Qty: 30 TABLET | Refills: 1 | Status: SHIPPED | OUTPATIENT
Start: 2018-11-08 | End: 2018-11-08

## 2018-11-08 RX ADMIN — SENNOSIDES AND DOCUSATE SODIUM 2 TABLET: 8.6; 5 TABLET ORAL at 07:25

## 2018-11-08 RX ADMIN — LEVOTHYROXINE SODIUM 88 MCG: 88 TABLET ORAL at 07:20

## 2018-11-08 RX ADMIN — ACETAMINOPHEN 975 MG: 325 TABLET, FILM COATED ORAL at 07:21

## 2018-11-08 RX ADMIN — OXYCODONE HYDROCHLORIDE 5 MG: 5 TABLET ORAL at 10:48

## 2018-11-08 NOTE — DISCHARGE SUMMARY
[facility] Discharge Summary    Courtney Sparks MRN# [id number]   Age: 33 year old YOB: 1985     Date of Admission:  2018  Date of Discharge::  2018   Admitting Physician:  Brayden Menjivar MD  Discharge Physician:  Luci Naranjo MD             Admission Diagnoses:   Intrauterine pregnancy at 38w6d  T1DM   Ulcerative colitis   H/o prior C/S   Beta thalassemia  Hypothyroidism           Discharge Diagnosis:   Same, delivered           Procedures:   Procedure(s): Repeat low transverse  section with two layer closure via Pfannenstiel  skin incision  Spinal                Medications Prior to Admission:     Prescriptions Prior to Admission   Medication Sig Dispense Refill Last Dose     insulin aspart (NOVOLOG FLEXPEN) 100 UNIT/ML injection Takes 1 unit for every 25 points of blood glucose over 150; and takes 1 unit/per 3 grams of carbohydrates.        Levothyroxine Sodium (SYNTHROID PO) Take 88 mcg by mouth daily   2018 at Unknown time     Mercaptopurine (PURINETHOL PO) Take 100 mg by mouth daily    2018 at Unknown time     Vedolizumab (ENTYVIO IV) Every 8 weeks   Past Month at Unknown time             Discharge Medications:        Review of your medicines      START taking       Dose / Directions    acetaminophen 325 MG tablet   Commonly known as:  TYLENOL        Dose:  650 mg   Take 2 tablets (650 mg) by mouth every 4 hours as needed for other (multimodal surgical pain management along with NSAIDS and opioid medication as indicated based on pain control and physical function.)   Quantity:  50 tablet   Refills:  1       ferrous sulfate 325 (65 Fe) MG tablet   Commonly known as:  IRON        Dose:  325 mg   Take 1 tablet (325 mg) by mouth 2 times daily   Quantity:  60 tablet   Refills:  1       oxyCODONE IR 5 MG tablet   Commonly known as:  ROXICODONE        Dose:  5 mg   Take 1 tablet (5 mg) by mouth every 4 hours as needed for moderate to severe pain    Quantity:  16 tablet   Refills:  0       senna-docusate 8.6-50 MG per tablet   Commonly known as:  SENOKOT-S;PERICOLACE        Dose:  1 tablet   Take 1 tablet by mouth 2 times daily as needed for constipation   Quantity:  30 tablet   Refills:  1         CONTINUE these medicines which may have CHANGED, or have new prescriptions. If we are uncertain of the size of tablets/capsules you have at home, strength may be listed as something that might have changed.       Dose / Directions    TRESIBA FLEXTOUCH 100 UNIT/ML pen   This may have changed:    - medication strength  - how much to take  - how to take this  - when to take this  - additional instructions   Generic drug:  insulin degludec        8 Units subcutaneous twice daily.   Refills:  0         CONTINUE these medicines which have NOT CHANGED       Dose / Directions    ENTYVIO IV        Every 8 weeks   Refills:  0       NovoLOG FLEXPEN 100 UNIT/ML injection   Generic drug:  insulin aspart        Takes 1 unit for every 25 points of blood glucose over 150; and takes 1 unit/per 3 grams of carbohydrates.   Refills:  0       PURINETHOL PO        Dose:  100 mg   Take 100 mg by mouth daily   Refills:  0       SYNTHROID PO        Dose:  88 mcg   Take 88 mcg by mouth daily   Refills:  0            Where to get your medicines      These medications were sent to [facility] [place] [address] [address] [address]     Phone:  [phone]      ferrous sulfate 325 (65 Fe) MG tablet     senna-docusate 8.6-50 MG per tablet         Some of these will need a paper prescription and others can be bought over the counter. Ask your nurse if you have questions.     Bring a paper prescription for each of these medications      acetaminophen 325 MG tablet     oxyCODONE IR 5 MG tablet                    Consultations:   Medicine          Brief Admission History:   The patient is a 33-year-old white female,  2, para 1,  1-0-2, with LMP 2018 and MONTSE 2018, who was admitted for repeat  section at term.  The patient had preexisting type 1 diabetes, hypothyroidism, and beta thalassemia.  She was followed for these problems with consultants during the course of the pregnancy and had continued surveillance with perinatology throughout the pregnancy.  On the recommendations of Perinatology a decision was made to perform repeat  section at 39 weeks' gestation.  Potential risks and complications were reviewed and written consent was obtained.        Intraoperative course   The procedure was uncomplicated.   mL.  See operative report for details.     OPERATIVE FINDINGS:   1.  The patient delivered a 7-pound, 9-ounce female infant with Apgars of 8 at 1 minute and 9 at 5 minutes from ROP presentation.   2.  Multiple unsuccessful attempts at vacuum-assisted delivery followed by successful vacuum-assisted delivery with Mityvac.   3.  Normal uterus and adnexa to inspection.        Postpartum Course   The patient's hospital course was unremarkable.  Given her multiple co-morbidities as outlined above, medicine was consulted and followed along, primarily making medication adjustments with her diabetes.  She recovered as anticipated and experienced no post-operative complications.  On discharge, her pain was well controlled. Vaginal bleeding is similar to peak menstrual flow.  Voiding without difficulty.  Ambulating well and tolerating a normal diet.  No fever or significant wound drainage.  Breastfeeding well.  Infant is stable. She was discharged on post-partum day #3.    Post-partum hemoglobin:   Hemoglobin   Date Value Ref Range Status   2018 8.4 (L) 11.7 - 15.7 g/dL Final          Discharge Instructions and Follow-Up:   Discharge diet: Regular   Discharge activity: No lifting greater than 20 lbs, pushing, pulling, or other strenuous activity for 6 weeks. Pelvic rest for 6 weeks including no sexual  intercourse, tampons, or douching. No driving until you can slam on the breaks without pain or while on narcotic pain medications.    Discharge follow-up: - Make staple removal appointment for Monday, 11/12  - Follow up with your primary OB provider in  weeks for your post-partum visit and to check your thyroid checked  - Follow up with your endocrinologist as previously discussed  - Follow up with your GI specialist as needed  - Follow-up with endocrinology in 5-6 weeks to discuss diabetic management and insulin dosing.  I would recommend continuing 8 units twice daily of Tresiba and your short acting meal coverage and correction scale as previously being performed.  If blood glucose continuously remains elevated I would increase your Tresiba to 10 units subcutaneous twice daily.  Conversely if blood sugar is consistently low I would decrease Tresiba to 6 units twice daily subcutaneous.  Also notify your PCP or endocrinologist of any of these changes.   Wound care: - Keep incision clean and dry           Discharge Disposition:   Discharged to home      Luci Farrar MD   November 8, 2018

## 2018-11-08 NOTE — PLAN OF CARE
Patient discharged to home in stable condition with baby and significant other. Discharge instructions, medications, home health referral and need for post-partum clinic visit all reviewed with patient and significant other. Patient verbalized understanding and has no further questions at this time.

## 2018-11-08 NOTE — PROGRESS NOTES
[facility]   Brief Post-partum Note    Name:  Courtney Sparks  MRN: [id number]    S: Patient is doing well this Am.  Pain controlled, however, she is noticed more deeper R>L sided pain today.  Tolerating regular diet without nausea or vomiting.  Ambulating without dizziness.  Voiding spontaneously, passing flatus with small BM. Lochia less compared to menstrual flow.  Breast feeding.  Desires OCPs for contraception.  Plans discharge today.    O:   Patient Vitals for the past 24 hrs:   BP Temp Temp src Pulse Resp   18 0800 124/83 98.1  F (36.7  C) Oral 93 -   18 2300 125/77 97.9  F (36.6  C) Oral 94 12   18 1545 128/79 98.1  F (36.7  C) Oral 96 -     Gen:  Resting comfortably, NAD  CV:  Regular rate and rhythm   Pulm:  Non-labored breathing.  No cough or wheezing.   Abd:  Soft, appropriately tender to palpation, non-distended.  Fundus at -2 umbilicus, firm and non-tender.  Incision: Staples in place.  Wound edges well approximately, no findings of erythema or induration.  Ext:  Non-tender, trace LE edema b/l    Assessment/Plan:  33 year old  on POD #3 s/p RLTCS.  Continue with routine postpartum management.     T1DM:   - Hospitalist team following; they decreased her tresiba from 8 to 6 units BID yesterday by secondary to hypoglycemia increased back to 8 units BID  - Patient to resume pre-pregnancy mealtime sliding scale per Endocrinology  - Patient will schedule f/u visit in 5-6 weeks    Ulcerative colitis:   - Hospitalist team following  - Continue PTA mercaptopurine and vedolizumab infusions   - Has GI follow-up appointment scheduled in December     Hypothyroidism:   - PTA levothyroxine 88 mcg daily  - TSH at PP visit     Pain: Well-controlled with PO medications   Hgb:   - 10.1>EBL 900cc> 8.4. VSS as noted above, asymptomatic.  Will discharge home with PO iron supplementation.   - Beta thalassemia; can resume baby ASA on POD7     GI:  BID Senna/Colace.  PRN Simethicone.    PPx:  Encouraged ambulation   Rh: Positive  Rubella: Immune  Feed: Breast  BC: OCPs at 6 week PP visit   Dispo: Plan for home today    Luci Farrar MD   Pager: [phone]   November 8, 2018

## 2018-11-08 NOTE — PLAN OF CARE
Problem: Patient Care Overview  Goal: Plan of Care/Patient Progress Review  Outcome: Improving  VSS. Incision WDL. Continuing to monitor own BG and self-administer insulin. Pain controlled with Tylenol. Anticipated dc today.

## 2018-11-08 NOTE — PLAN OF CARE
Problem: Patient Care Overview  Goal: Plan of Care/Patient Progress Review  Outcome: No Change  VSS. Pt is ambulating, fundus firm, staples in place, incision approximated. Formula feeding baby. Reviewed discharge education and answered all questions. Discharged to home at 1249

## 2018-11-08 NOTE — PROVIDER NOTIFICATION
11/08/18 1032   Provider Notification   Provider Name/Title Dr Chaudhari Hospitalist   Method of Notification Phone   Request Evaluate - Remote   Notification Reason Other (Comment)   Dr Chaudhari called and we asked him the verify the discharge medications for the patient before discharge for Dr ANJEL Beltran. Dr Chaudhari said he would verify the medications.

## 2018-11-08 NOTE — PROGRESS NOTES
[facility]    Hospitalist Progress Note  Name: Courtney Sparks    MRN: [id number]  Provider:  Angel Chaudhari DO, MPH  Date of Service: 2018    Summary of Stay: This patient is a 33 year old female with a PMH significant for DM I, beta thalassemia, ulcerative colitis, hypothyroidism migraine headaches who is s/p scheduled repeat  section on .  Hospitalist team was consulted for diabetic management.      1. IUP s/p : Doing well, no current complaints. DVT Prophylaxis: on PCDs as per primary. To home on today.      2. DM I: Longstanding type 1 diabetic, A1c's have been in 5-range throughout pregnancy. She is currently wearing a glucose monitoring meter ([employer] system) on her arm and reports her blood glucose levels have been steadily in the 90s since . Patient's endocrinologist Eddie Riley ([facility]).  She had a blood glucose of 40 yesterday morning so her long-acting insulin was decreased from 8 units to 6 units.  She reports subsequently having hypoglycemia episodes and changed back on her own to 8 units of long-acting insulin twice daily.  She declines any adjustments in her insulin today and tells me she will be taking 8 units of Tresiba twice daily on discharge while continuing her previous correction scale and meal coverage.  -Patient reports being very comfortable managing her blood sugars rather independently  -She indicates that her endocrinologist will loosen up on her tight glycemic control now that she has successfully delivered  -Per endocrinologist's recommendations s/p C/S resumed pre-pregnancy mealtime sliding scale insulin (self-correction only)  -Continue Tresiba from 8 units BID per endocrinology recommendations as she reports some hypoglycemia yesterday when she decreased to 6 units of Tresiba  -Endocrine follow up in 5-6 weeks  -Discharge orders written to decrease insulin if hypoglycemic episodes and conversely increase insulin if  hyperglycemic episodes while remaining in communication with her outpatient team      3. Beta thalassemia: Diagnosed because her father and brother have this diagnosis as well. She personally has had no bleeding or clotting issues previously. Hgb yesterday pre-op was 10.1. No known active bleeding, lightheadedness, dyspnea, acute leg swelling or extremity pain.  Hemoglobin able.  No indication for transfusion.  -Ok to resume PTA aspirin when deemed safe by primary care team  -Continue PCDs      4. Ulcerative colitis: Continue PTA mercaptopurine. She is also on vedolizumab infusions Q8 weeks that will be resumed on discharge.      5. Hypothyroidism: Continue PTA levothyroxine 88 mcg daily.    DVT Prophylaxis: Defer to primary service  Code Status: No Order  Disposition: Expected discharge in 0 days to home. Goals prior to discharge include post partum management.   Incidental Findings: None.  Family updated today: Yes      Interval History   Assumed care from previous hospitalist. The history was fully reviewed.  The patient reports doing well. No chest pain or shortness of breath. No nausea, vomiting, diarrhea, constipation. No fevers. No other specific complaints identified.     She reports good glycemic control.  She indicates that she increased her to Tresiba back to 8 units subcutaneous twice daily.  She denies any hypoglycemia since yesterday morning.  She indicates that most of her blood glucose values have been between 80 and 100 except for some hyperglycemia yesterday afternoon and yesterday evening following a decrease in her to Tresiba dose to 6 units.  Glucose values are not recorded in the chart so I have no specific/confirmed values.  Furthermore she has been taking some of her own home supply of insulin and medications which did not do not appear to actively be reflected on the MAR.    -Data reviewed today: I personally reviewed all new labs and imaging results over the last 24 hours.     Physical Exam    Temp: 98.1  F (36.7  C) Temp src: Oral BP: 124/83 Pulse: 93   Resp: 12        Vitals:    11/05/18 0602   Weight: 85.7 kg (189 lb)     Vital Signs with Ranges  Temp:  [97.9  F (36.6  C)-98.1  F (36.7  C)] 98.1  F (36.7  C)  Pulse:  [93-96] 93  Resp:  [12] 12  BP: (124-128)/(77-83) 124/83       GENERAL: No apparent distress. Awake, alert, and fully oriented.  HEENT: Normocephalic, atraumatic. Extraocular movements intact.  CARDIOVASCULAR: Regular rate and rhythm without murmurs or rubs. No S3.  PULMONARY: Clear bilaterally.  GASTROINTESTINAL: Soft, non-tender, non-distended. Bowel sounds normoactive.   EXTREMITIES: No cyanosis or clubbing. No edema.  NEUROLOGICAL: CN 2-12 grossly intact, no focal neurological deficits.  DERMATOLOGICAL: No rash, ulcer, bruising, nor jaundice.     Medications     dextrose 5% lactated ringers 125 mL/hr at 11/05/18 1652     lactated ringers       - MEDICATION INSTRUCTIONS -       NO Rho (D) immune globulin (RhoGam) needed - mother Rh POSITIVE       - MEDICATION INSTRUCTIONS -       - MEDICATION INSTRUCTIONS -       oxytocin in 0.9% NaCl 100 mL/hr (11/05/18 1145)     oxytocin in 0.9% NaCl         ferrous sulfate  325 mg Oral Daily with breakfast     insulin aspart  1-10 Units Subcutaneous TID w/meals     insulin aspart   Subcutaneous TID AC     insulin degludec  6 Units Subcutaneous QAM     insulin degludec  6 Units Subcutaneous At Bedtime     levothyroxine (SYNTHROID/LEVOTHROID) tablet 88 mcg  88 mcg Oral Daily     mercaptopurine (PURINETHOL) tablet CHEMO 100 mg  100 mg Oral Daily     scopolamine  1 each Transdermal Once     Data     Laboratory:    Recent Labs  Lab 11/06/18  0645 11/04/18  0915   WBC 15.0*  --    HGB 8.4* 10.1*   HCT 26.6*  --    MCV 78  --      --        Recent Labs  Lab 11/06/18  0645      POTASSIUM 3.9   CHLORIDE 107   CO2 27   ANIONGAP 5   GLC 82   BUN 5*   CR 0.62   GFRESTIMATED >90   GFRESTBLACK >90   OANH 7.9*       Recent Labs  Lab 11/07/18  1832  11/07/18 0635 11/06/18 0645 11/05/18  0635   GLC  --   --  82  --    * 45*  --  94       Recent Labs  Lab 11/06/18  0645 11/04/18  0915   HGB 8.4* 10.1*     No results for input(s): CULT in the last 168 hours.    Imaging:  No results found for this or any previous visit (from the past 24 hour(s)).      Angel Chaudhari DO MPH  [facility] Hospitalist  [address] [place] [address]  Pager: [phone]  11/08/2018

## 2018-11-13 ENCOUNTER — HEALTH MAINTENANCE LETTER (OUTPATIENT)
Age: 33
End: 2018-11-13

## 2019-12-02 ENCOUNTER — HOSPITAL ENCOUNTER (EMERGENCY)
Facility: CLINIC | Age: 34
Discharge: HOME | End: 2019-12-02
Attending: EMERGENCY MEDICINE | Admitting: EMERGENCY MEDICINE
Payer: COMMERCIAL

## 2019-12-02 VITALS
SYSTOLIC BLOOD PRESSURE: 132 MMHG | HEART RATE: 95 BPM | RESPIRATION RATE: 16 BRPM | TEMPERATURE: 98.2 F | DIASTOLIC BLOOD PRESSURE: 78 MMHG | OXYGEN SATURATION: 100 %

## 2019-12-02 DIAGNOSIS — B02.9 HERPES ZOSTER WITHOUT COMPLICATION: ICD-10-CM

## 2019-12-02 PROCEDURE — 99283 EMERGENCY DEPT VISIT LOW MDM: CPT

## 2019-12-02 PROCEDURE — 25000132 ZZH RX MED GY IP 250 OP 250 PS 637: Performed by: EMERGENCY MEDICINE

## 2019-12-02 RX ORDER — HYDROCODONE BITARTRATE AND ACETAMINOPHEN 5; 325 MG/1; MG/1
1 TABLET ORAL EVERY 6 HOURS PRN
Qty: 10 TABLET | Refills: 0 | Status: SHIPPED | OUTPATIENT
Start: 2019-12-02

## 2019-12-02 RX ORDER — HYDROCODONE BITARTRATE AND ACETAMINOPHEN 5; 325 MG/1; MG/1
1 TABLET ORAL ONCE
Status: COMPLETED | OUTPATIENT
Start: 2019-12-02 | End: 2019-12-02

## 2019-12-02 RX ORDER — VALACYCLOVIR HYDROCHLORIDE 1 G/1
1000 TABLET, FILM COATED ORAL 3 TIMES DAILY
Qty: 21 TABLET | Refills: 0 | Status: SHIPPED | OUTPATIENT
Start: 2019-12-02 | End: 2019-12-09

## 2019-12-02 RX ADMIN — HYDROCODONE BITARTRATE AND ACETAMINOPHEN 1 TABLET: 5; 325 TABLET ORAL at 08:48

## 2019-12-02 ASSESSMENT — ENCOUNTER SYMPTOMS
PHOTOPHOBIA: 1
BACK PAIN: 1
LIGHT-HEADEDNESS: 1
DIZZINESS: 1
ABDOMINAL PAIN: 0
HEADACHES: 1
NAUSEA: 0

## 2019-12-02 NOTE — ED AVS SNAPSHOT
[facility] Emergency Department  [address]  [facility] [address]  Phone:  [phone]  Fax:  [phone]                                    Courtney Sparks   MRN: [id number]    Department:  [facility] Emergency Department   Date of Visit:  12/2/2019           After Visit Summary Signature Page    I have received my discharge instructions, and my questions have been answered. I have discussed any challenges I see with this plan with the nurse or doctor.    ..........................................................................................................................................  Patient/Patient Representative Signature      ..........................................................................................................................................  Patient Representative Print Name and Relationship to Patient    ..................................................               ................................................  Date                                   Time    ..........................................................................................................................................  Reviewed by Signature/Title    ...................................................              ..............................................  Date                                               Time          22EPIC Rev 08/18

## 2019-12-02 NOTE — ED TRIAGE NOTES
Patient presents to the ED with back pain. Reports right lower back pain since Tuesday. Was seen at [facility] on Thursday and prescribed a muscle relaxer, which made her dizzy and nauseated. Seen in urgent care on Saturday and prescribed tramadol, which also made her ill. Reports that around Wednesday began having a small rash on right lower back which now wraps around the side and is getting worse.

## 2019-12-02 NOTE — ED PROVIDER NOTES
"  History     Chief Complaint:  Back Pain and Rash    HPI   Courtney Sparks is a 34 year old female with a history of ulcerative colitis, type 1 diabetes, and genital herpes who presents with back pain and a rash. She reports that she developed right lower back pain 6 days ago. She reports that her pain felt like she was \"kicked\", was tender to the touch, and had overlying bruising. She reports that she has 2 kids so she may have sustained some trauma to the area. She reports that her back pain worsened to \"pulsating spasms\" and that she developed a spotty rash 5 days ago. She reports that she was \"unable to move\" 4 days ago, presented to [facility], and was discharged home with Cyclobenzaprine. She reports that she tried taking this at home but states that it didn't relieve her back pain and made her nauseous and lightheaded. She reports that she tried to \"plow through\" her pain on 3 days ago and took Tylenol with limited relief of her pain. She reports that she went to Urgent Care in [place] 2 days ago, received an x-ray which was reassuring for skeletal etiology of her symptoms, and was given Tramadol in the clinic which made her feel nauseous and lightheaded but did not relieve her symptoms. She reports that she took Tylenol for pain relief yesterday. She reports that her back pain became less painful this morning but has spread to both sides. She reports that her rash has spread to encompass a larger area of her back on her right side as well as expanding to the front of her right leg. She denies that her rash is painful to the touch but states that it hurts when her underwear bumps it. She reports that she has dizziness, lightheadedness, headache, photophobia, and phonophobia in the emergency department. She reports that this feels like her history of migraines. She denies nausea, abdominal pain, or chest pain in the emergency department. She reports that received a shingles vaccine but " states that she is on immunosuppressant medication for her ulcerative colitis. She states that she developed jaundice while taking Valtrex to treat a rash on her fingers during her pregnancy. She reports that she is limited on the types of pain medication she can take because of her ulcerative colitis. She reports that she is working on the Tamtron and is worried about taking opoid pain medications.    Allergies:  Shellfish  Asaxol  Balsalazide  Cortenema  Hydrocortisone  Insulin glargine  Mesalamine  Valacyclovir  Acyclovir    Medications:    Senna-docusate  Synthroid  Becca  Purinethol    Past Medical History:    Type 1 DM  Lipoma of neck  Ulcerative colitis  HLD  Herpes simplex, genital  Varicella  Hypothyroidism  IBS  Migraines  Non proliferative diabetic retinopathy    Past Surgical History:     x2  Joppa teeth extraction    Family History:    Osteoarthritis  Cancer  Heart disease  HTN  HLD  Stroke - father  Heart disease - father  Arthritis    Social History:  Smoking status: Never  Alcohol use: No    Patient presents to the emergency department with her mother  Marital Status:       Review of Systems   HENT:        Phonophobia   Eyes: Positive for photophobia.   Cardiovascular: Negative for chest pain.   Gastrointestinal: Negative for abdominal pain and nausea.   Musculoskeletal: Positive for back pain.   Skin: Positive for rash.   Neurological: Positive for dizziness, light-headedness and headaches.   All other systems reviewed and are negative.    Physical Exam     Patient Vitals for the past 24 hrs:   BP Temp Temp src Pulse Resp SpO2   19 0735 132/78 98.2  F (36.8  C) Temporal 95 16 100 %       Physical Exam  General: Patient is alert and cooperative.  HENT:  Normal nose, oropharynx.  Eyes: EOMI. Normal conjunctiva.  Neck:  Normal range of motion and appearance.   Cardiovascular:  Normal rate.   Pulmonary/Chest:  Effort normal.   Abdominal: Soft. No distension or  tenderness.     Musculoskeletal: Normal range of motion. No edema or tenderness.   Neurological: oriented, normal strength, sensation, coordination.  Skin: raised, erythematous welts right lower thorax, dermatomal distribution, dose not cross midline to left.    Psychiatric: Normal mood and affect. Normal behavior and judgement.      Emergency Department Course   ECG:  Indication: Back pain  Time: 0821  Vent. Rate 89 bpm. WY interval 116. QRS duration 82. QT/QTc 358/435. P-R-T axis 40 57 29. Normal sinus rhythm. Normal ECG. Read time: 0850.    Interventions:  0848: Norco 5/325 1 tablet PO    Emergency Department Course:  Nursing notes and vitals reviewed. (0823) I performed an exam of the patient as documented above.     Medicine administered as documented above.    Findings and plan explained to the Patient. Patient discharged home with instructions regarding supportive care, medications, and reasons to return. The importance of close follow-up was reviewed. The patient was prescribed Norco and Valtrex.    Impression & Plan    Medical Decision Making:  Courtney Sparks is a 34 year old female who presents for evaluation of rash and back pain.  The rash is in a dermatomal distribution over a single dermatome and appears consistent with herpes zoster.  Symptoms are consistent with zoster as well.  The patient is otherwise well-appearing.  There is no immunosuppression or systemic disease concerning for disseminated zoster.  There is no Larson's sign of infection concerned for ophthalmalgic zoster.  There are no signs of superinfection.  Given the onset of symptoms, a prescription for valacyclovir was provided as it has been shown to decrease post-herpetic neuralgia.  Pain medications were also prescribed.  The patient is to follow-up with PMD in the next 3-5 days and was given precautions to return for fever, diffuse spread of rash, signs of cellulitis or any other worsening.    Diagnosis:    ICD-10-CM    1.  Herpes zoster without complication B02.9        Disposition:  discharged to home    Discharge Medications:  Discharge Medication List as of 12/2/2019  9:08 AM      START taking these medications    Details   HYDROcodone-acetaminophen (NORCO) 5-325 MG tablet Take 1 tablet by mouth every 6 hours as needed for severe pain, Disp-10 tablet, R-0, Local Print      valACYclovir (VALTREX) 1000 mg tablet Take 1 tablet (1,000 mg) by mouth 3 times daily for 7 days, Disp-21 tablet, R-0, Local Print             Angel Cornell  12/2/2019   [facility] EMERGENCY DEPARTMENT  Scribe Disclosure:  I, Angel Cornell, am serving as a scribe on 12/2/2019 at 8:23 AM to personally document services performed by Justo Morgan MD based on my observations and the provider's statements to me.      Justo Morgan MD  12/03/19 0957

## 2019-12-03 ENCOUNTER — HOSPITAL ENCOUNTER (EMERGENCY)
Facility: CLINIC | Age: 34
Discharge: HOME | End: 2019-12-03
Attending: EMERGENCY MEDICINE | Admitting: EMERGENCY MEDICINE
Payer: COMMERCIAL

## 2019-12-03 VITALS
BODY MASS INDEX: 27.46 KG/M2 | WEIGHT: 155 LBS | RESPIRATION RATE: 18 BRPM | DIASTOLIC BLOOD PRESSURE: 75 MMHG | OXYGEN SATURATION: 100 % | HEIGHT: 63 IN | HEART RATE: 86 BPM | TEMPERATURE: 98.5 F | SYSTOLIC BLOOD PRESSURE: 135 MMHG

## 2019-12-03 DIAGNOSIS — B02.9 HERPES ZOSTER WITHOUT COMPLICATION: ICD-10-CM

## 2019-12-03 DIAGNOSIS — G44.209 TENSION HEADACHE: ICD-10-CM

## 2019-12-03 DIAGNOSIS — T50.905A MEDICATION SIDE EFFECTS, INITIAL ENCOUNTER: ICD-10-CM

## 2019-12-03 LAB
ALBUMIN SERPL-MCNC: 3.1 G/DL (ref 3.4–5)
ALP SERPL-CCNC: 40 U/L (ref 40–150)
ALT SERPL W P-5'-P-CCNC: 23 U/L (ref 0–50)
ANION GAP SERPL CALCULATED.3IONS-SCNC: 12 MMOL/L (ref 3–14)
ANISOCYTOSIS BLD QL SMEAR: SLIGHT
AST SERPL W P-5'-P-CCNC: 24 U/L (ref 0–45)
B-HCG FREE SERPL-ACNC: <5 IU/L
BASOPHILS # BLD AUTO: 0 10E9/L (ref 0–0.2)
BASOPHILS NFR BLD AUTO: 1 %
BILIRUB DIRECT SERPL-MCNC: 0.2 MG/DL (ref 0–0.2)
BILIRUB SERPL-MCNC: 0.5 MG/DL (ref 0.2–1.3)
BUN SERPL-MCNC: 8 MG/DL (ref 7–30)
CALCIUM SERPL-MCNC: 8.3 MG/DL (ref 8.5–10.1)
CHLORIDE SERPL-SCNC: 103 MMOL/L (ref 94–109)
CO2 SERPL-SCNC: 20 MMOL/L (ref 20–32)
CREAT SERPL-MCNC: 0.74 MG/DL (ref 0.52–1.04)
DIFFERENTIAL METHOD BLD: ABNORMAL
ELLIPTOCYTES BLD QL SMEAR: SLIGHT
EOSINOPHIL # BLD AUTO: 0.1 10E9/L (ref 0–0.7)
EOSINOPHIL NFR BLD AUTO: 2 %
ERYTHROCYTE [DISTWIDTH] IN BLOOD BY AUTOMATED COUNT: 15.5 % (ref 10–15)
GFR SERPL CREATININE-BSD FRML MDRD: >90 ML/MIN/{1.73_M2}
GLUCOSE SERPL-MCNC: 251 MG/DL (ref 70–99)
HCT VFR BLD AUTO: 35 % (ref 35–47)
HGB BLD-MCNC: 10.8 G/DL (ref 11.7–15.7)
LYMPHOCYTES # BLD AUTO: 1.6 10E9/L (ref 0.8–5.3)
LYMPHOCYTES NFR BLD AUTO: 38 %
MCH RBC QN AUTO: 22.2 PG (ref 26.5–33)
MCHC RBC AUTO-ENTMCNC: 30.9 G/DL (ref 31.5–36.5)
MCV RBC AUTO: 72 FL (ref 78–100)
MICROCYTES BLD QL SMEAR: PRESENT
MONOCYTES # BLD AUTO: 0.3 10E9/L (ref 0–1.3)
MONOCYTES NFR BLD AUTO: 6 %
NEUTROPHILS # BLD AUTO: 2.2 10E9/L (ref 1.6–8.3)
NEUTROPHILS NFR BLD AUTO: 53 %
PLATELET # BLD AUTO: 203 10E9/L (ref 150–450)
PLATELET # BLD EST: ABNORMAL 10*3/UL
POTASSIUM SERPL-SCNC: 3.7 MMOL/L (ref 3.4–5.3)
PROT SERPL-MCNC: 7.8 G/DL (ref 6.8–8.8)
RBC # BLD AUTO: 4.86 10E12/L (ref 3.8–5.2)
SODIUM SERPL-SCNC: 135 MMOL/L (ref 133–144)
VARIANT LYMPHS BLD QL SMEAR: PRESENT
WBC # BLD AUTO: 4.2 10E9/L (ref 4–11)

## 2019-12-03 PROCEDURE — 25800030 ZZH RX IP 258 OP 636: Performed by: EMERGENCY MEDICINE

## 2019-12-03 PROCEDURE — 85025 COMPLETE CBC W/AUTO DIFF WBC: CPT | Performed by: EMERGENCY MEDICINE

## 2019-12-03 PROCEDURE — 80048 BASIC METABOLIC PNL TOTAL CA: CPT | Performed by: EMERGENCY MEDICINE

## 2019-12-03 PROCEDURE — 80076 HEPATIC FUNCTION PANEL: CPT | Performed by: EMERGENCY MEDICINE

## 2019-12-03 PROCEDURE — 99284 EMERGENCY DEPT VISIT MOD MDM: CPT | Mod: 25

## 2019-12-03 PROCEDURE — 96375 TX/PRO/DX INJ NEW DRUG ADDON: CPT

## 2019-12-03 PROCEDURE — 84702 CHORIONIC GONADOTROPIN TEST: CPT

## 2019-12-03 PROCEDURE — 96361 HYDRATE IV INFUSION ADD-ON: CPT

## 2019-12-03 PROCEDURE — 96374 THER/PROPH/DIAG INJ IV PUSH: CPT

## 2019-12-03 PROCEDURE — 25000128 H RX IP 250 OP 636: Performed by: EMERGENCY MEDICINE

## 2019-12-03 RX ORDER — ONDANSETRON 4 MG/1
4 TABLET, ORALLY DISINTEGRATING ORAL EVERY 6 HOURS PRN
Qty: 10 TABLET | Refills: 0 | Status: SHIPPED | OUTPATIENT
Start: 2019-12-03 | End: 2019-12-07

## 2019-12-03 RX ORDER — DIPHENHYDRAMINE HYDROCHLORIDE 50 MG/ML
25 INJECTION INTRAMUSCULAR; INTRAVENOUS ONCE
Status: COMPLETED | OUTPATIENT
Start: 2019-12-03 | End: 2019-12-03

## 2019-12-03 RX ORDER — METOCLOPRAMIDE HYDROCHLORIDE 5 MG/ML
10 INJECTION INTRAMUSCULAR; INTRAVENOUS ONCE
Status: COMPLETED | OUTPATIENT
Start: 2019-12-03 | End: 2019-12-03

## 2019-12-03 RX ORDER — KETOROLAC TROMETHAMINE 15 MG/ML
15 INJECTION, SOLUTION INTRAMUSCULAR; INTRAVENOUS ONCE
Status: COMPLETED | OUTPATIENT
Start: 2019-12-03 | End: 2019-12-03

## 2019-12-03 RX ADMIN — SODIUM CHLORIDE 1000 ML: 9 INJECTION, SOLUTION INTRAVENOUS at 19:44

## 2019-12-03 RX ADMIN — DIPHENHYDRAMINE HYDROCHLORIDE 25 MG: 50 INJECTION, SOLUTION INTRAMUSCULAR; INTRAVENOUS at 20:11

## 2019-12-03 RX ADMIN — METOCLOPRAMIDE 10 MG: 5 INJECTION, SOLUTION INTRAMUSCULAR; INTRAVENOUS at 20:11

## 2019-12-03 RX ADMIN — KETOROLAC TROMETHAMINE 15 MG: 15 INJECTION, SOLUTION INTRAMUSCULAR; INTRAVENOUS at 20:11

## 2019-12-03 ASSESSMENT — ENCOUNTER SYMPTOMS
HEADACHES: 1
VOMITING: 1
NAUSEA: 1

## 2019-12-03 ASSESSMENT — MIFFLIN-ST. JEOR: SCORE: 1367.21

## 2019-12-03 NOTE — ED AVS SNAPSHOT
[facility] Emergency Department  [address]  [facility] [address]  Phone:  [phone]  Fax:  [phone]                                    Courtney Sparks   MRN: [id number]    Department:  [facility] Emergency Department   Date of Visit:  12/3/2019           After Visit Summary Signature Page    I have received my discharge instructions, and my questions have been answered. I have discussed any challenges I see with this plan with the nurse or doctor.    ..........................................................................................................................................  Patient/Patient Representative Signature      ..........................................................................................................................................  Patient Representative Print Name and Relationship to Patient    ..................................................               ................................................  Date                                   Time    ..........................................................................................................................................  Reviewed by Signature/Title    ...................................................              ..............................................  Date                                               Time          22EPIC Rev 08/18

## 2019-12-04 NOTE — ED TRIAGE NOTES
Pt arrives to the ED due to a headache that began this AM. Pt seen yesterday and diagnosed with shingles. Pt started on valtrex. Pt c/o of dizziness, pain on sides of head and across forehead. C/o of mild nausea. Sensitive to light and sound. Denies blurry vision. Pt seen at clinic and sent here for further eval. Pt states h/o of cluster headaches.

## 2019-12-04 NOTE — ED PROVIDER NOTES
"  History     Chief Complaint:  Headache      HPI   Courtney Sparks is a 34 year old female with a history of migraines who presents to the emergency department for evaluation of a headache. The patient was diagnosed with shingles yesterday and started on valacyclovir.  Patient received this medication once in the past while she was pregnant out of concern for herpetic beth.  At that time, patient actually came jaundiced and ended up delivering early.  It was never known if her symptoms were due to valacyclovir or something different.  The patient reports her headache is bilateral from temporal to temporal. She has had one migraine in 2012 that she had vision issues with, unlike this time. She does not typically grind her teeth, but she has had fevers and chills due to the shingles causing her clench her jaw. The patient reports a headache, nausea, and vomiting. The patient denies visual disturbance.    Allergies:  Shellfish  Asacol  Balsalazide  Cortenema    Medications:    Norco  Synthroid  Purinethol  Roxicodone  Senokot  Valtrex    Past Medical History:    Jaundice  Anemia  Diabetes mellitus  Herpes simplex without mention of complication  Thyroid disease  Ulcerative colitis  Migraine    Past Surgical History:     section x2  [facility]  [facility]    Family History:    No past pertinent family history.    Social History:  The patient presents today with her .  Never smoker  Negative for alcohol use.  Negative for drug use.  Marital Status:      Review of Systems   Eyes: Negative for visual disturbance.   Gastrointestinal: Positive for nausea and vomiting.   Neurological: Positive for headaches.   All other systems reviewed and are negative.      Physical Exam     Patient Vitals for the past 24 hrs:   BP Temp Temp src Pulse Heart Rate Resp SpO2 Height Weight   19 1848 136/84 98.5  F (36.9  C) Oral 93 93 18 100 % 1.6 m (5' 3\") 70.3 kg (155 lb)     Physical Exam  Nursing " note and vitals reviewed.  General: Patient is alert and interactive when I enter the room  Head:  The scalp, face, and head appear normal  Eyes:  Conjunctivae are normal. EOMI.   ENT:    The nose is normal    Pinnae are normal  Neck:  Trachea midline  CV:  Normal rat  Resp:  No respiratory distress   Musc:  Normal muscular tone  Skin:  Classic shingles rash, scabbed over, noted to right lower abdomen and lower back in a dermatomal distribution.  Neuro: Speech is normal and fluent. Face is symmetric. Moving all extremities well.  Normal gait.  Psych:  Awake. Alert.  Normal affect.  Appropriate interactions.      Emergency Department Course     Laboratory:  Laboratory findings were communicated with the patient who voiced understanding of the findings.    CBC: HGB 10.8 (L) o/w WNL. (WBC 4.2, )   BMP: Glucose 251 (H), Calcium 8.3 (L) o/w WNL (Creatinine 0.74)    1942 Hepatic panel: Bilirubin direct 0.2, Bilirubin total 0.5, ALBUMIN 3.1 (L), Protein 7.8, ALKPHOS 40, ALT 23, AST 24     ISTAT HCG Quantitative Pregnancy POCT: HCG <5.0    Interventions:  1944 NS 1L IV    2011 Toradol 15 mg IV    2011 Reglan 10 mg IV    2011 Benadryl 25 mg IV    Emergency Department Course:    1902 Nursing notes and vitals reviewed.    1917 I performed an exam of the patient as documented above.     1942 IV was inserted and blood was drawn for laboratory testing, results above.    1947 Blood was drawn for laboratory testing, results above.    2042 Patient rechecked and updated.     Findings and plan explained to the Patient. Patient discharged home with instructions regarding supportive care, medications, and reasons to return. The importance of close follow-up was reviewed. The patient was prescribed Zofran.    Impression & Plan     Medical Decision Making:  Courtney Sparks is a 34 year old female who presents to the emergency department today with nausea, vomiting, headache.  This is in the context of recent valacyclovir given  for shingles.  Patient reports a history of possible allergy to this medication in the past.  Here, patient is appearing nontoxic, and her headache is down to a 2 after typical headache cocktail.  She is describing a classic tension headache, most likely due to dehydration and vomiting.  Doubt any intracranial bleed, meningitis/encephalitis, especially with normal white count and absence of fever.  Patient and I had long discussion concerning whether or not she should continue to take valacyclovir.  It is possible that the Valacyclovir was started past its usual effectiveness.  We discussed that valacyclovir has been noted to help for preventing postherpetic neuralgia, though does not really help recover from the lesions faster.  We also discussed that it is usually most effective if given in the first 2 to 3 days of symptoms, which it was not in her case.  We also discussed that the side effects she is experiencing are not life-threatening, and that if we can better manage her side effects, she may be able to finish the course.  Patient would prefer to try this, therefore she was prescribed Zofran for home use. She is in stable condition at the time of discharge, indications for return to the ED were discussed as well as follow up. All questions were answered and she and her  are in agreement with the plan.    Discharge Diagnosis:    ICD-10-CM    1. Tension headache G44.209    2. Medication side effects, initial encounter T50.905A    3. Herpes zoster without complication B02.9      Disposition:  The patient is discharged to home.    Discharge Medications:  New Prescriptions    ONDANSETRON (ZOFRAN ODT) 4 MG ODT TAB    Take 1 tablet (4 mg) by mouth every 6 hours as needed for nausea or vomiting     Scribe Disclosure:  I, Jaswant Velasquez, am serving as a scribe at 7:20 PM on 12/3/2019 to document services personally performed by Ton Escamilla MD based on my observations and the provider's statements to me.       Ton Escamilla MD  12/04/19 0346

## 2020-03-02 ENCOUNTER — HEALTH MAINTENANCE LETTER (OUTPATIENT)
Age: 35
End: 2020-03-02

## 2020-12-14 ENCOUNTER — HEALTH MAINTENANCE LETTER (OUTPATIENT)
Age: 35
End: 2020-12-14

## 2021-03-23 ENCOUNTER — IMMUNIZATION (OUTPATIENT)
Dept: NURSING | Facility: CLINIC | Age: 36
End: 2021-03-23
Payer: COMMERCIAL

## 2021-03-23 PROCEDURE — 91300 PR COVID VAC PFIZER DIL RECON 30 MCG/0.3 ML IM: CPT

## 2021-03-23 PROCEDURE — 0001A PR COVID VAC PFIZER DIL RECON 30 MCG/0.3 ML IM: CPT

## 2021-04-13 ENCOUNTER — IMMUNIZATION (OUTPATIENT)
Dept: NURSING | Facility: CLINIC | Age: 36
End: 2021-04-13
Attending: INTERNAL MEDICINE
Payer: COMMERCIAL

## 2021-04-13 PROCEDURE — 91300 PR COVID VAC PFIZER DIL RECON 30 MCG/0.3 ML IM: CPT

## 2021-04-13 PROCEDURE — 0002A PR COVID VAC PFIZER DIL RECON 30 MCG/0.3 ML IM: CPT

## 2021-04-18 ENCOUNTER — HEALTH MAINTENANCE LETTER (OUTPATIENT)
Age: 36
End: 2021-04-18

## 2021-10-02 ENCOUNTER — HEALTH MAINTENANCE LETTER (OUTPATIENT)
Age: 36
End: 2021-10-02

## 2022-05-14 ENCOUNTER — HEALTH MAINTENANCE LETTER (OUTPATIENT)
Age: 37
End: 2022-05-14

## 2022-09-03 ENCOUNTER — HEALTH MAINTENANCE LETTER (OUTPATIENT)
Age: 37
End: 2022-09-03

## 2023-06-03 ENCOUNTER — HEALTH MAINTENANCE LETTER (OUTPATIENT)
Age: 38
End: 2023-06-03

## 2024-02-07 ENCOUNTER — HOSPITAL ENCOUNTER (EMERGENCY)
Facility: CLINIC | Age: 39
Discharge: HOME | End: 2024-02-07
Attending: EMERGENCY MEDICINE | Admitting: EMERGENCY MEDICINE
Payer: COMMERCIAL

## 2024-02-07 VITALS
RESPIRATION RATE: 18 BRPM | HEIGHT: 63 IN | BODY MASS INDEX: 28.05 KG/M2 | WEIGHT: 158.29 LBS | TEMPERATURE: 97.2 F | HEART RATE: 102 BPM | OXYGEN SATURATION: 100 % | SYSTOLIC BLOOD PRESSURE: 101 MMHG | DIASTOLIC BLOOD PRESSURE: 75 MMHG

## 2024-02-07 DIAGNOSIS — R51.9 SINUS HEADACHE: ICD-10-CM

## 2024-02-07 DIAGNOSIS — J10.1 INFLUENZA B: ICD-10-CM

## 2024-02-07 LAB
FLUAV RNA SPEC QL NAA+PROBE: NEGATIVE
FLUBV RNA RESP QL NAA+PROBE: POSITIVE
RSV RNA SPEC NAA+PROBE: NEGATIVE
SARS-COV-2 RNA RESP QL NAA+PROBE: NEGATIVE

## 2024-02-07 PROCEDURE — 250N000011 HC RX IP 250 OP 636: Mod: JZ | Performed by: EMERGENCY MEDICINE

## 2024-02-07 PROCEDURE — 99284 EMERGENCY DEPT VISIT MOD MDM: CPT | Mod: 25

## 2024-02-07 PROCEDURE — 96375 TX/PRO/DX INJ NEW DRUG ADDON: CPT

## 2024-02-07 PROCEDURE — 258N000003 HC RX IP 258 OP 636: Performed by: EMERGENCY MEDICINE

## 2024-02-07 PROCEDURE — 87637 SARSCOV2&INF A&B&RSV AMP PRB: CPT | Performed by: EMERGENCY MEDICINE

## 2024-02-07 PROCEDURE — 96361 HYDRATE IV INFUSION ADD-ON: CPT

## 2024-02-07 PROCEDURE — 96374 THER/PROPH/DIAG INJ IV PUSH: CPT

## 2024-02-07 RX ORDER — DEXAMETHASONE SODIUM PHOSPHATE 10 MG/ML
10 INJECTION, SOLUTION INTRAMUSCULAR; INTRAVENOUS ONCE
Status: COMPLETED | OUTPATIENT
Start: 2024-02-07 | End: 2024-02-07

## 2024-02-07 RX ORDER — DIPHENHYDRAMINE HYDROCHLORIDE 50 MG/ML
25 INJECTION INTRAMUSCULAR; INTRAVENOUS ONCE
Status: COMPLETED | OUTPATIENT
Start: 2024-02-07 | End: 2024-02-07

## 2024-02-07 RX ORDER — METOCLOPRAMIDE HYDROCHLORIDE 5 MG/ML
10 INJECTION INTRAMUSCULAR; INTRAVENOUS ONCE
Status: COMPLETED | OUTPATIENT
Start: 2024-02-07 | End: 2024-02-07

## 2024-02-07 RX ORDER — KETOROLAC TROMETHAMINE 15 MG/ML
15 INJECTION, SOLUTION INTRAMUSCULAR; INTRAVENOUS ONCE
Status: COMPLETED | OUTPATIENT
Start: 2024-02-07 | End: 2024-02-07

## 2024-02-07 RX ADMIN — KETOROLAC TROMETHAMINE 15 MG: 15 INJECTION, SOLUTION INTRAMUSCULAR; INTRAVENOUS at 20:22

## 2024-02-07 RX ADMIN — DIPHENHYDRAMINE HYDROCHLORIDE 25 MG: 50 INJECTION INTRAMUSCULAR; INTRAVENOUS at 20:19

## 2024-02-07 RX ADMIN — SODIUM CHLORIDE 1000 ML: 9 INJECTION, SOLUTION INTRAVENOUS at 20:19

## 2024-02-07 RX ADMIN — METOCLOPRAMIDE HYDROCHLORIDE 10 MG: 5 INJECTION INTRAMUSCULAR; INTRAVENOUS at 20:26

## 2024-02-07 RX ADMIN — DEXAMETHASONE SODIUM PHOSPHATE 10 MG: 10 INJECTION INTRAMUSCULAR; INTRAVENOUS at 20:23

## 2024-02-07 ASSESSMENT — ACTIVITIES OF DAILY LIVING (ADL)
ADLS_ACUITY_SCORE: 35
ADLS_ACUITY_SCORE: 33

## 2024-02-08 NOTE — DISCHARGE INSTRUCTIONS
For sinus headache, can take mucinex OR mucinex-D (has sudafed) OR mucinex DM (has robitussin).   Also start daily flonase and claritin until you start feeling better.     I don't think you need to continue the antibiotics, however if you get into next week Mon/Tue without improvement, or if you get return of body aches/chills, or develop fevers, it would make sense to restart as I'd expect influenza to be mostly resolved by then.     Return to emergency department for worsening symptoms, confusion, seizure, or for any other concerns.     Discharge Instructions  Headache    You were seen today for a headache. Headaches may be caused by many different things such as muscle tension, sinus inflammation, anxiety and stress, having too little sleep, too much alcohol, some medical conditions or injury. You may have a migraine, which is caused by changes in the blood vessels in your head.  At this time your provider does not find that your headache is a sign of anything dangerous or life-threatening.  However, sometimes the signs of serious illness do not show up right away.      Generally, every Emergency Department visit should have a follow-up clinic visit with either a primary or a specialty clinic/provider. Please follow-up as instructed by your emergency provider today.    Return to the Emergency Department if:  You get a new fever of 100.4 F or higher.  Your headache gets much worse.  You get a stiff neck with your headache.  You get a new headache that is significantly different or worse than headaches you have had before.  You are vomiting (throwing up) and cannot keep food or water down.  You have blurry or double vision or other problems with your eyes.  You have a new weakness on one side of your body.  You have difficulty with balance which is new.  You or your family thinks you are confused.  You have a seizure.    What can I do to help myself?  Pain medications - You may take a pain medication such as  Tylenol  (acetaminophen), Advil , Motrin  (ibuprofen) or Aleve  (naproxen).  Take a pain reliever as soon as you notice symptoms.  Starting medications as soon as you start to have symptoms may lessen the amount of pain you have.  Relaxing in a quiet, dark room may help.  Get enough sleep and eat meals regularly.  You may need to watch for certain foods or other things which may trigger your headaches.  Keeping a journal of your headaches and possible triggers may help you and your primary provider to identify things which you should avoid which may be causing your headaches.  If you were given a prescription for medicine here today, be sure to read all of the information (including the package insert) that comes with your prescription.  This will include important information about the medicine, its side effects, and any warnings that you need to know about.  The pharmacist who fills the prescription can provide more information and answer questions you may have about the medicine.  If you have questions or concerns that the pharmacist cannot address, please call or return to the Emergency Department.   Remember that you can always come back to the Emergency Department if you are not able to see your regular provider in the amount of time listed above, if you get any new symptoms, or if there is anything that worries you.      Discharge Instructions  Influenza    You were diagnosed today with influenza or influenza like illness.  Influenza is a respiratory (breathing) illness caused by influenza A or B viruses.  Influenza causes five primary symptoms: fever, headache, muscle aches/fatigue/malaise, sore throat and cough.  These symptoms start one to four days after you have been around a person with this illness. Influenza is spread through sneezing and coughing and can live on surfaces for several days.  It is usually contagious for 5 days but in some cases up to 10 days and often affects several family members. If  you have a family member who is less than 2 years old, older than 65 years old, pregnant or has a serious medical condition, they should be seen right away by a provider to decide if they should take preventative medications. Although influenza will make you feel very ill, most patients don t require any specific treatment. An antiviral medication might be prescribed for certain groups of patients (older patients, younger patients, and those with certain chronic medical problems).    Generally, every Emergency Department visit should have a follow-up clinic visit with either a primary or a specialty clinic/provider. Please follow-up as instructed by your emergency provider today.    Return to the Emergency Department if:  You have trouble breathing.  You develop pain in your chest.  You have signs of being dehydrated, such as dizziness or unable to urinate (pee) at least three times daily.  You are confused or severely weak.  You cannot stop vomiting (throwing up) or you cannot drink enough fluids.    In children, you should seek help if the child has any of the above or if child:  Has blue or purplish skin color.  Is so irritable that he or she does not want to be held.  Does not have tears when crying (in infants) or does not urinate at least three times daily.  Does not wake up easily.    What can I do to help myself?  Rest.  Fluids -- Drink hydrating solutions such as Gatorade  or Pedialyte  as often as you can. If you are drinking enough, you should pass urine at least every eight hours.  Tylenol  (acetaminophen) and Advil  (ibuprofen) can relieve fever, headache, and muscle aches. Do not give aspirin to children under 18 years old.   Antiviral treatment -- Antiviral medicines do not make the flu symptoms go away immediately.  They have only been shown to make the symptoms go away 12 to 24 hours sooner than they would without treatment.     Antibiotics -- Antibiotics are NOT useful for treating viral illnesses  such as influenza. Antibiotics should only be used if there is a bacterial complication of the flu such as bacterial pneumonia, ear infection, or sinusitis.  Because you were diagnosed with a flu like illness you are very contagious.  This means you cannot work, attend school or  for at least 24 hours or until you no longer have a fever.  If you were given a prescription for medicine here today, be sure to read all of the information (including the package insert) that comes with your prescription.  This will include important information about the medicine, its side effects, and any warnings that you need to know about.  The pharmacist who fills the prescription can provide more information and answer questions you may have about the medicine.  If you have questions or concerns that the pharmacist cannot address, please call or return to the Emergency Department.   Remember that you can always come back to the Emergency Department if you are not able to see your regular provider in the amount of time listed above, if you get any new symptoms, or if there is anything that worries you.

## 2024-02-08 NOTE — ED PROVIDER NOTES
"  History     Chief Complaint:  Headache       HPI   Courtney Sparks is a 38 year old female with history of migraines who presents with 9 days of headache that worsened and become constant 5 days ago. 9 days ago patient began to have body aches, chills, cough, and headache which could be controled with tylenol. 5 days ago her headache worsened and she was not able to do anything for the rest of the day. She was seen in clinic the following day, tested positive for Influenza B, and was suspected to have a sinus infection. She was started on augmentin out of concern for sinusitis. Her body aches and chills improved but the headache continued to worsen. Here she notes sensitivity to light and sound. She has had a migraine before but she had more vision change at that time. Patient took sudafed yesterday and mucinex today. She was not able to tolerate swallowing her antibiotics today.    Independent Historian:   None - Patient Only    Review of External Notes:    Reviewed phone note from earlier today, e-visit from yesterday, office visit from 2 days ago.       Medications:    Norco   Synthroid   Valtrex   Crestor  Topamax  Desyrel   Albuterol   Maxalt   Flonase     Past Medical History:    Type 1 diabetes mellitus   Herpes simplex without mention of complication  Thyroid disease  Ulcerative colitis   Hyperlipidemia   IBS  Migraine     Past Surgical History:    [place] tooth extraction    section     Physical Exam   Patient Vitals for the past 24 hrs:   BP Temp Temp src Pulse Resp SpO2 Height Weight   24 127/80 -- -- 96 18 100 % -- --   24 1821 138/87 97.2  F (36.2  C) Temporal 104 18 98 % 1.6 m (5' 3\") 71.8 kg (158 lb 4.6 oz)        Physical Exam  Nursing note and vitals reviewed.  HENT:   Mouth/Throat: Moist mucous membranes.   Eyes: EOMI, nonicteric sclera  Cardiovascular: Normal rate, regular rhythm, no murmurs, rubs, or gallops  Pulmonary/Chest: Effort normal and breath sounds normal. No " respiratory distress. No wheezes. No rales.   Musculoskeletal: Normal range of motion.   Neurological: Alert. Moves all extremities spontaneously.     CN's II-XII intact. 5/5 BUE and BLE strength. PERRL    EOMI without nystagmus.      Sensation intact to light touch. Negative pronator drift.    Finger to nose intact.   Skin: Skin is warm and dry. No rash noted.         Emergency Department Course   Laboratory:  Labs Ordered and Resulted from Time of ED Arrival to Time of ED Departure   INFLUENZA A/B, RSV, & SARS-COV2 PCR - Abnormal       Result Value    Influenza A PCR Negative      Influenza B PCR Positive (*)     RSV PCR Negative      SARS CoV2 PCR Negative          Procedures   None     Emergency Department Course & Assessments:  Interventions:  Medications   sodium chloride 0.9% BOLUS 1,000 mL (1,000 mLs Intravenous $New Bag 2/7/24 2019)   ketorolac (TORADOL) injection 15 mg (15 mg Intravenous $Given 2/7/24 2022)   metoclopramide (REGLAN) injection 10 mg (10 mg Intravenous $Given 2/7/24 2026)   diphenhydrAMINE (BENADRYL) injection 25 mg (25 mg Intravenous $Given 2/7/24 2019)   dexAMETHasone PF (DECADRON) injection 10 mg (10 mg Intravenous $Given 2/7/24 2023)        Assessments/Consultations/Discussion of Management or Tests:   ED Course as of 02/07/24 2148 Wed Feb 07, 2024 1948 I obtained history and examined the patient as noted above.    2148 I rechecked the patient and explained findings.        Social Determinants of Health affecting care:   None    Disposition:  The patient was discharged.     Impression & Plan    Medical Decision Making:  Pt presents with CC headache as well as flu-like symptoms. Pt recently tested positive for influenza B. She was outside of treatment window for influenza, but was started on augmentin for concern of bacterial sinusitis. Exam normal. Vital signs unremarkable. She feels symptomatically improved after ED interventions. Suspect headache is sinus-related given laterality  and ongoing sinus symptoms. Gave dex as part of migraine cocktail which should assist with sinus symptoms. Discussed with pt that likelihood of bacterial sinusitis in context of known influenza is less likely and wouldn't recommend use unless no improvement after the next few days. Given time course and fever curve, expect improvement of symptoms in coming days. She is in stable condition at the time of discharge, red flags that should merit ED return were discussed as well as recommended follow-up instructions. All questions were answered and she is in agreement with the plan.        Diagnosis:    ICD-10-CM    1. Influenza B  J10.1       2. Sinus headache  R51.9            Discharge Medications:  Discharge Medication List as of 2/7/2024 10:02 PM             Scribe Disclosure:  I, Jr Caputo, am serving as a scribe at 7:55 PM on 2/7/2024 to document services personally performed by Ton Escamilla MD based on my observations and the provider's statements to me.        Ton Escamilla MD  02/10/24 [id number]

## 2024-02-08 NOTE — ED TRIAGE NOTES
Pt arrives with complaint of headache beginning last Tuesday, worsening on Sunday night and persistent since then. No OTC meds improved symptoms. Photophobia and sound sensitivity. Cough since last Thursday, On abx for sinus infection, but threw up last two doses. A&Ox4.

## 2024-07-06 ENCOUNTER — HEALTH MAINTENANCE LETTER (OUTPATIENT)
Age: 39
End: 2024-07-06

## 2025-03-09 ENCOUNTER — HEALTH MAINTENANCE LETTER (OUTPATIENT)
Age: 40
End: 2025-03-09

## (undated) DEVICE — LINEN BABY BLANKET 5434

## (undated) DEVICE — LINEN TOWEL PACK X10 5473

## (undated) DEVICE — GLOVE PROTEXIS W/NEU-THERA 7.0  2D73TE70

## (undated) DEVICE — SU MONOCRYL 0 CT 36" Y358H

## (undated) DEVICE — LINEN FULL SHEET 5511

## (undated) DEVICE — SOL WATER IRRIG 1000ML BOTTLE 2F7114

## (undated) DEVICE — Device

## (undated) DEVICE — SU VICRYL 1 CTX 36" J371H

## (undated) DEVICE — GLOVE PROTEXIS POWDER FREE SMT 6.0  2D72PT60X

## (undated) DEVICE — PAD CHUX UNDERPAD 30X36" P3036C

## (undated) DEVICE — LINEN HALF SHEET 5512

## (undated) DEVICE — STOCKING SLEEVE VASOPRESS COMPRESSION CALF MED 18" VP501M

## (undated) DEVICE — CAP BABY PINK/BLUE IC-2

## (undated) DEVICE — SUCTION KIWI VAC  VAC-6000M

## (undated) DEVICE — SOLIDIFIER FLUID RED 1500ML LIQUID KIT SYS POWDER ISOB1500

## (undated) DEVICE — BLADE KNIFE SURG 10 371110

## (undated) DEVICE — PREP POVIDONE IODINE SOLUTION 10% 4OZ

## (undated) DEVICE — SU MONOCRYL 4-0 PS-2 27" UND Y426H

## (undated) DEVICE — TRANSFER DEVICE BLOOD NDL HOLDER 364880

## (undated) DEVICE — LINEN DRAPE 54X72" 5467

## (undated) DEVICE — GLOVE PROTEXIS POWDER FREE SMT 7.0  2D72PT70X

## (undated) DEVICE — SOL NACL 0.9% IRRIG 1000ML BOTTLE 2F7124

## (undated) DEVICE — SUCTION CANISTER MEDIVAC LINER 3000ML W/LID 65651-530

## (undated) DEVICE — PREP SKIN SCRUB TRAY 4461A

## (undated) DEVICE — SU VICRYL 0 CT-1 36" J346H

## (undated) DEVICE — BAG CLEAR TRASH 1.3M 39X33" P4040C

## (undated) DEVICE — CATH TRAY FOLEY SURESTEP 16FR DRAIN BAG STATOCK A899916

## (undated) DEVICE — PREP CHLORAPREP 26ML TINTED ORANGE  260815

## (undated) DEVICE — PACK C-SECTION LF PL15OTA83B

## (undated) DEVICE — BLADE CLIPPER SGL USE 9680

## (undated) DEVICE — SU VICRYL 2-0 CT-1 36" J345H

## (undated) DEVICE — DRSG STERI STRIP 1/2X4" R1547

## (undated) DEVICE — ESU GROUND PAD ADULT W/CORD E7507

## (undated) DEVICE — STPL SKIN 35W APPOSE 8886803712

## (undated) RX ORDER — MORPHINE SULFATE 1 MG/ML
INJECTION, SOLUTION EPIDURAL; INTRATHECAL; INTRAVENOUS
Status: DISPENSED
Start: 2018-11-05

## (undated) RX ORDER — BUPIVACAINE HYDROCHLORIDE 7.5 MG/ML
INJECTION, SOLUTION INTRASPINAL
Status: DISPENSED
Start: 2018-11-05

## (undated) RX ORDER — OXYTOCIN/0.9 % SODIUM CHLORIDE 30/500 ML
PLASTIC BAG, INJECTION (ML) INTRAVENOUS
Status: DISPENSED
Start: 2018-11-05

## (undated) RX ORDER — ONDANSETRON 2 MG/ML
INJECTION INTRAMUSCULAR; INTRAVENOUS
Status: DISPENSED
Start: 2018-11-05

## (undated) RX ORDER — EPHEDRINE SULFATE 50 MG/ML
INJECTION, SOLUTION INTRAMUSCULAR; INTRAVENOUS; SUBCUTANEOUS
Status: DISPENSED
Start: 2018-11-05

## (undated) RX ORDER — PHENYLEPHRINE HCL IN 0.9% NACL 1 MG/10 ML
SYRINGE (ML) INTRAVENOUS
Status: DISPENSED
Start: 2018-11-05

## (undated) RX ORDER — FENTANYL CITRATE 50 UG/ML
INJECTION, SOLUTION INTRAMUSCULAR; INTRAVENOUS
Status: DISPENSED
Start: 2018-11-05